# Patient Record
Sex: FEMALE | HISPANIC OR LATINO | Employment: UNEMPLOYED | URBAN - METROPOLITAN AREA
[De-identification: names, ages, dates, MRNs, and addresses within clinical notes are randomized per-mention and may not be internally consistent; named-entity substitution may affect disease eponyms.]

---

## 2019-06-19 ENCOUNTER — HOSPITAL ENCOUNTER (OUTPATIENT)
Dept: LAB | Facility: MEDICAL CENTER | Age: 49
End: 2019-06-19
Attending: NURSE PRACTITIONER

## 2019-06-19 LAB
ALBUMIN SERPL BCP-MCNC: 4.1 G/DL (ref 3.2–4.9)
ALBUMIN/GLOB SERPL: 1.1 G/DL
ALP SERPL-CCNC: 106 U/L (ref 30–99)
ALT SERPL-CCNC: 20 U/L (ref 2–50)
ANION GAP SERPL CALC-SCNC: 11 MMOL/L (ref 0–11.9)
AST SERPL-CCNC: 25 U/L (ref 12–45)
BILIRUB SERPL-MCNC: 0.6 MG/DL (ref 0.1–1.5)
BUN SERPL-MCNC: 15 MG/DL (ref 8–22)
CALCIUM SERPL-MCNC: 9.8 MG/DL (ref 8.5–10.5)
CHLORIDE SERPL-SCNC: 103 MMOL/L (ref 96–112)
CHOLEST SERPL-MCNC: 193 MG/DL (ref 100–199)
CO2 SERPL-SCNC: 23 MMOL/L (ref 20–33)
CREAT SERPL-MCNC: 0.63 MG/DL (ref 0.5–1.4)
FASTING STATUS PATIENT QL REPORTED: NORMAL
GLOBULIN SER CALC-MCNC: 3.8 G/DL (ref 1.9–3.5)
GLUCOSE SERPL-MCNC: 89 MG/DL (ref 65–99)
HDLC SERPL-MCNC: 46 MG/DL
LDLC SERPL CALC-MCNC: 109 MG/DL
POTASSIUM SERPL-SCNC: 4.2 MMOL/L (ref 3.6–5.5)
PROT SERPL-MCNC: 7.9 G/DL (ref 6–8.2)
SODIUM SERPL-SCNC: 137 MMOL/L (ref 135–145)
TRIGL SERPL-MCNC: 191 MG/DL (ref 0–149)

## 2019-06-19 PROCEDURE — 80061 LIPID PANEL: CPT

## 2019-06-19 PROCEDURE — 80053 COMPREHEN METABOLIC PANEL: CPT

## 2019-06-19 PROCEDURE — 36415 COLL VENOUS BLD VENIPUNCTURE: CPT

## 2019-06-19 PROCEDURE — 83036 HEMOGLOBIN GLYCOSYLATED A1C: CPT

## 2019-06-20 LAB
EST. AVERAGE GLUCOSE BLD GHB EST-MCNC: 148 MG/DL
HBA1C MFR BLD: 6.8 % (ref 0–5.6)

## 2019-11-27 ENCOUNTER — HOSPITAL ENCOUNTER (OUTPATIENT)
Dept: LAB | Facility: MEDICAL CENTER | Age: 49
End: 2019-11-27
Attending: NURSE PRACTITIONER

## 2019-11-27 LAB
ALBUMIN SERPL BCP-MCNC: 4.1 G/DL (ref 3.2–4.9)
ALBUMIN/GLOB SERPL: 1.2 G/DL
ALP SERPL-CCNC: 107 U/L (ref 30–99)
ALT SERPL-CCNC: 11 U/L (ref 2–50)
ANION GAP SERPL CALC-SCNC: 7 MMOL/L (ref 0–11.9)
AST SERPL-CCNC: 17 U/L (ref 12–45)
BASOPHILS # BLD AUTO: 0.7 % (ref 0–1.8)
BASOPHILS # BLD: 0.07 K/UL (ref 0–0.12)
BILIRUB SERPL-MCNC: 0.5 MG/DL (ref 0.1–1.5)
BUN SERPL-MCNC: 18 MG/DL (ref 8–22)
CALCIUM SERPL-MCNC: 8.9 MG/DL (ref 8.5–10.5)
CHLORIDE SERPL-SCNC: 104 MMOL/L (ref 96–112)
CHOLEST SERPL-MCNC: 151 MG/DL (ref 100–199)
CO2 SERPL-SCNC: 23 MMOL/L (ref 20–33)
CREAT SERPL-MCNC: 0.54 MG/DL (ref 0.5–1.4)
EOSINOPHIL # BLD AUTO: 0.13 K/UL (ref 0–0.51)
EOSINOPHIL NFR BLD: 1.4 % (ref 0–6.9)
ERYTHROCYTE [DISTWIDTH] IN BLOOD BY AUTOMATED COUNT: 47.1 FL (ref 35.9–50)
EST. AVERAGE GLUCOSE BLD GHB EST-MCNC: 126 MG/DL
GLOBULIN SER CALC-MCNC: 3.4 G/DL (ref 1.9–3.5)
GLUCOSE SERPL-MCNC: 105 MG/DL (ref 65–99)
HBA1C MFR BLD: 6 % (ref 0–5.6)
HCT VFR BLD AUTO: 41.7 % (ref 37–47)
HDLC SERPL-MCNC: 47 MG/DL
HGB BLD-MCNC: 12.9 G/DL (ref 12–16)
IMM GRANULOCYTES # BLD AUTO: 0.03 K/UL (ref 0–0.11)
IMM GRANULOCYTES NFR BLD AUTO: 0.3 % (ref 0–0.9)
LDLC SERPL CALC-MCNC: 77 MG/DL
LYMPHOCYTES # BLD AUTO: 3.43 K/UL (ref 1–4.8)
LYMPHOCYTES NFR BLD: 36.2 % (ref 22–41)
MCH RBC QN AUTO: 26.8 PG (ref 27–33)
MCHC RBC AUTO-ENTMCNC: 30.9 G/DL (ref 33.6–35)
MCV RBC AUTO: 86.7 FL (ref 81.4–97.8)
MONOCYTES # BLD AUTO: 0.51 K/UL (ref 0–0.85)
MONOCYTES NFR BLD AUTO: 5.4 % (ref 0–13.4)
NEUTROPHILS # BLD AUTO: 5.31 K/UL (ref 2–7.15)
NEUTROPHILS NFR BLD: 56 % (ref 44–72)
NRBC # BLD AUTO: 0 K/UL
NRBC BLD-RTO: 0 /100 WBC
PLATELET # BLD AUTO: 338 K/UL (ref 164–446)
PMV BLD AUTO: 10 FL (ref 9–12.9)
POTASSIUM SERPL-SCNC: 4 MMOL/L (ref 3.6–5.5)
PROT SERPL-MCNC: 7.5 G/DL (ref 6–8.2)
RBC # BLD AUTO: 4.81 M/UL (ref 4.2–5.4)
SODIUM SERPL-SCNC: 134 MMOL/L (ref 135–145)
TRIGL SERPL-MCNC: 135 MG/DL (ref 0–149)
TSH SERPL DL<=0.005 MIU/L-ACNC: 1.6 UIU/ML (ref 0.38–5.33)
WBC # BLD AUTO: 9.5 K/UL (ref 4.8–10.8)

## 2019-11-27 PROCEDURE — 80061 LIPID PANEL: CPT

## 2019-11-27 PROCEDURE — 85025 COMPLETE CBC W/AUTO DIFF WBC: CPT

## 2019-11-27 PROCEDURE — 83036 HEMOGLOBIN GLYCOSYLATED A1C: CPT

## 2019-11-27 PROCEDURE — 80053 COMPREHEN METABOLIC PANEL: CPT

## 2019-11-27 PROCEDURE — 84443 ASSAY THYROID STIM HORMONE: CPT

## 2019-11-27 PROCEDURE — 36415 COLL VENOUS BLD VENIPUNCTURE: CPT

## 2021-12-30 ENCOUNTER — HOSPITAL ENCOUNTER (INPATIENT)
Facility: MEDICAL CENTER | Age: 51
LOS: 5 days | DRG: 418 | End: 2022-01-05
Attending: STUDENT IN AN ORGANIZED HEALTH CARE EDUCATION/TRAINING PROGRAM | Admitting: INTERNAL MEDICINE
Payer: MEDICAID

## 2021-12-30 DIAGNOSIS — K80.50 CHOLEDOCHOLITHIASIS: Primary | ICD-10-CM

## 2021-12-30 DIAGNOSIS — K80.81 BILIARY CALCULUS OF OTHER SITE WITH OBSTRUCTION: ICD-10-CM

## 2021-12-30 DIAGNOSIS — I16.0 HYPERTENSIVE URGENCY: ICD-10-CM

## 2021-12-30 DIAGNOSIS — K80.01 CALCULUS OF GALLBLADDER WITH ACUTE CHOLECYSTITIS AND OBSTRUCTION: ICD-10-CM

## 2021-12-30 DIAGNOSIS — E11.9 TYPE 2 DIABETES MELLITUS WITHOUT COMPLICATION, WITHOUT LONG-TERM CURRENT USE OF INSULIN (HCC): ICD-10-CM

## 2021-12-30 LAB
BASOPHILS # BLD AUTO: 0.4 % (ref 0–1.8)
BASOPHILS # BLD: 0.05 K/UL (ref 0–0.12)
EOSINOPHIL # BLD AUTO: 0.11 K/UL (ref 0–0.51)
EOSINOPHIL NFR BLD: 0.8 % (ref 0–6.9)
ERYTHROCYTE [DISTWIDTH] IN BLOOD BY AUTOMATED COUNT: 48.2 FL (ref 35.9–50)
HCT VFR BLD AUTO: 42.7 % (ref 37–47)
HGB BLD-MCNC: 13.3 G/DL (ref 12–16)
IMM GRANULOCYTES # BLD AUTO: 0.07 K/UL (ref 0–0.11)
IMM GRANULOCYTES NFR BLD AUTO: 0.5 % (ref 0–0.9)
LYMPHOCYTES # BLD AUTO: 3.1 K/UL (ref 1–4.8)
LYMPHOCYTES NFR BLD: 23.3 % (ref 22–41)
MCH RBC QN AUTO: 26.1 PG (ref 27–33)
MCHC RBC AUTO-ENTMCNC: 31.1 G/DL (ref 33.6–35)
MCV RBC AUTO: 83.9 FL (ref 81.4–97.8)
MONOCYTES # BLD AUTO: 0.55 K/UL (ref 0–0.85)
MONOCYTES NFR BLD AUTO: 4.1 % (ref 0–13.4)
NEUTROPHILS # BLD AUTO: 9.45 K/UL (ref 2–7.15)
NEUTROPHILS NFR BLD: 70.9 % (ref 44–72)
NRBC # BLD AUTO: 0 K/UL
NRBC BLD-RTO: 0 /100 WBC
PLATELET # BLD AUTO: 347 K/UL (ref 164–446)
PMV BLD AUTO: 9.6 FL (ref 9–12.9)
RBC # BLD AUTO: 5.09 M/UL (ref 4.2–5.4)
WBC # BLD AUTO: 13.3 K/UL (ref 4.8–10.8)

## 2021-12-30 PROCEDURE — 93005 ELECTROCARDIOGRAM TRACING: CPT

## 2021-12-30 PROCEDURE — 85025 COMPLETE CBC W/AUTO DIFF WBC: CPT

## 2021-12-30 PROCEDURE — 99291 CRITICAL CARE FIRST HOUR: CPT

## 2021-12-30 PROCEDURE — 84703 CHORIONIC GONADOTROPIN ASSAY: CPT

## 2021-12-30 PROCEDURE — 83690 ASSAY OF LIPASE: CPT

## 2021-12-30 PROCEDURE — 83036 HEMOGLOBIN GLYCOSYLATED A1C: CPT

## 2021-12-30 PROCEDURE — 93005 ELECTROCARDIOGRAM TRACING: CPT | Performed by: STUDENT IN AN ORGANIZED HEALTH CARE EDUCATION/TRAINING PROGRAM

## 2021-12-30 PROCEDURE — 80053 COMPREHEN METABOLIC PANEL: CPT

## 2021-12-30 PROCEDURE — 36415 COLL VENOUS BLD VENIPUNCTURE: CPT

## 2021-12-30 NOTE — LETTER
[facility]  GEN SURGERY [facility] 4TH  [address] [place] [id number]  [phone]     January 3, 2022    Patient: Darling Islas   YOB: 1970   Date of Visit: 01/03/2022       To Whom It May Concern:    Darling Islas was hospitalized at [facility] from 12/31/21 to 1/3/22 and required her , Lorenzo Bajwa, to be present during this time for care giving.    Sincerely,     August Devi M.D.

## 2021-12-31 ENCOUNTER — APPOINTMENT (OUTPATIENT)
Dept: RADIOLOGY | Facility: MEDICAL CENTER | Age: 51
DRG: 418 | End: 2021-12-31
Attending: STUDENT IN AN ORGANIZED HEALTH CARE EDUCATION/TRAINING PROGRAM
Payer: MEDICAID

## 2021-12-31 ENCOUNTER — ANESTHESIA EVENT (OUTPATIENT)
Dept: SURGERY | Facility: MEDICAL CENTER | Age: 51
DRG: 418 | End: 2021-12-31
Payer: MEDICAID

## 2021-12-31 ENCOUNTER — ANESTHESIA (OUTPATIENT)
Dept: SURGERY | Facility: MEDICAL CENTER | Age: 51
DRG: 418 | End: 2021-12-31
Payer: MEDICAID

## 2021-12-31 ENCOUNTER — APPOINTMENT (OUTPATIENT)
Dept: RADIOLOGY | Facility: MEDICAL CENTER | Age: 51
DRG: 418 | End: 2021-12-31
Attending: INTERNAL MEDICINE
Payer: MEDICAID

## 2021-12-31 PROBLEM — D72.829 LEUKOCYTOSIS (LEUCOCYTOSIS): Status: ACTIVE | Noted: 2021-12-31

## 2021-12-31 PROBLEM — K80.00 CALCULUS OF GALLBLADDER WITH ACUTE CHOLECYSTITIS WITHOUT OBSTRUCTION: Status: ACTIVE | Noted: 2021-12-31

## 2021-12-31 PROBLEM — E66.813 CLASS 3 SEVERE OBESITY IN ADULT: Status: ACTIVE | Noted: 2021-12-31

## 2021-12-31 PROBLEM — E66.01 CLASS 3 SEVERE OBESITY IN ADULT (HCC): Status: ACTIVE | Noted: 2021-12-31

## 2021-12-31 PROBLEM — K80.50 CHOLEDOCHOLITHIASIS: Status: ACTIVE | Noted: 2021-12-31

## 2021-12-31 PROBLEM — R74.01 TRANSAMINITIS: Status: ACTIVE | Noted: 2021-12-31

## 2021-12-31 PROBLEM — R10.11 RIGHT UPPER QUADRANT ABDOMINAL PAIN: Status: ACTIVE | Noted: 2021-12-31

## 2021-12-31 PROBLEM — R73.03 BORDERLINE DIABETES MELLITUS: Status: ACTIVE | Noted: 2021-12-31

## 2021-12-31 LAB
ALBUMIN SERPL BCP-MCNC: 4.3 G/DL (ref 3.2–4.9)
ALBUMIN/GLOB SERPL: 1.2 G/DL
ALP SERPL-CCNC: 590 U/L (ref 30–99)
ALT SERPL-CCNC: 232 U/L (ref 2–50)
ANION GAP SERPL CALC-SCNC: 12 MMOL/L (ref 7–16)
APPEARANCE UR: CLEAR
APTT PPP: 24 SEC (ref 24.7–36)
AST SERPL-CCNC: 328 U/L (ref 12–45)
BACTERIA #/AREA URNS HPF: NEGATIVE /HPF
BILIRUB SERPL-MCNC: 1.4 MG/DL (ref 0.1–1.5)
BILIRUB UR QL STRIP.AUTO: NEGATIVE
BUN SERPL-MCNC: 14 MG/DL (ref 8–22)
CALCIUM SERPL-MCNC: 9.8 MG/DL (ref 8.5–10.5)
CHLORIDE SERPL-SCNC: 98 MMOL/L (ref 96–112)
CO2 SERPL-SCNC: 24 MMOL/L (ref 20–33)
COLOR UR: YELLOW
CREAT SERPL-MCNC: 0.49 MG/DL (ref 0.5–1.4)
EKG IMPRESSION: NORMAL
EPI CELLS #/AREA URNS HPF: NORMAL /HPF
EST. AVERAGE GLUCOSE BLD GHB EST-MCNC: 163 MG/DL
EXTERNAL QUALITY CONTROL: NORMAL
GLOBULIN SER CALC-MCNC: 3.7 G/DL (ref 1.9–3.5)
GLUCOSE SERPL-MCNC: 182 MG/DL (ref 65–99)
GLUCOSE UR STRIP.AUTO-MCNC: NEGATIVE MG/DL
HBA1C MFR BLD: 7.3 % (ref 4–5.6)
HCG SERPL QL: NEGATIVE
HYALINE CASTS #/AREA URNS LPF: NORMAL /LPF
INR PPP: 0.96 (ref 0.87–1.13)
KETONES UR STRIP.AUTO-MCNC: NEGATIVE MG/DL
LEUKOCYTE ESTERASE UR QL STRIP.AUTO: NEGATIVE
LIPASE SERPL-CCNC: 38 U/L (ref 11–82)
MICRO URNS: ABNORMAL
NITRITE UR QL STRIP.AUTO: NEGATIVE
PATHOLOGY CONSULT NOTE: NORMAL
PH UR STRIP.AUTO: 6 [PH] (ref 5–8)
POTASSIUM SERPL-SCNC: 3.6 MMOL/L (ref 3.6–5.5)
PROT SERPL-MCNC: 8 G/DL (ref 6–8.2)
PROT UR QL STRIP: NEGATIVE MG/DL
PROTHROMBIN TIME: 12.5 SEC (ref 12–14.6)
RBC # URNS HPF: NORMAL /HPF
RBC UR QL AUTO: ABNORMAL
SARS-COV+SARS-COV-2 AG RESP QL IA.RAPID: NEGATIVE
SODIUM SERPL-SCNC: 134 MMOL/L (ref 135–145)
SP GR UR STRIP.AUTO: 1.01
UROBILINOGEN UR STRIP.AUTO-MCNC: 0.2 MG/DL
WBC #/AREA URNS HPF: NORMAL /HPF

## 2021-12-31 PROCEDURE — 502571 HCHG PACK, LAP CHOLE: Performed by: SURGERY

## 2021-12-31 PROCEDURE — 96365 THER/PROPH/DIAG IV INF INIT: CPT

## 2021-12-31 PROCEDURE — 74181 MRI ABDOMEN W/O CONTRAST: CPT

## 2021-12-31 PROCEDURE — 99221 1ST HOSP IP/OBS SF/LOW 40: CPT | Mod: 57 | Performed by: SURGERY

## 2021-12-31 PROCEDURE — 160009 HCHG ANES TIME/MIN: Performed by: SURGERY

## 2021-12-31 PROCEDURE — 501583 HCHG TROCAR, THRD CAN&SEAL 5X100: Performed by: SURGERY

## 2021-12-31 PROCEDURE — 99233 SBSQ HOSP IP/OBS HIGH 50: CPT | Performed by: INTERNAL MEDICINE

## 2021-12-31 PROCEDURE — 700111 HCHG RX REV CODE 636 W/ 250 OVERRIDE (IP): Performed by: INTERNAL MEDICINE

## 2021-12-31 PROCEDURE — 501838 HCHG SUTURE GENERAL: Performed by: SURGERY

## 2021-12-31 PROCEDURE — 700101 HCHG RX REV CODE 250: Performed by: ANESTHESIOLOGY

## 2021-12-31 PROCEDURE — 501577 HCHG TROCAR, STEP 11MM: Performed by: SURGERY

## 2021-12-31 PROCEDURE — 700111 HCHG RX REV CODE 636 W/ 250 OVERRIDE (IP): Performed by: ANESTHESIOLOGY

## 2021-12-31 PROCEDURE — 700105 HCHG RX REV CODE 258: Performed by: STUDENT IN AN ORGANIZED HEALTH CARE EDUCATION/TRAINING PROGRAM

## 2021-12-31 PROCEDURE — 501570 HCHG TROCAR, SEPARATOR: Performed by: SURGERY

## 2021-12-31 PROCEDURE — 501568 HCHG TROCAR, BLUNTPORT 12MM: Performed by: SURGERY

## 2021-12-31 PROCEDURE — 700105 HCHG RX REV CODE 258: Performed by: INTERNAL MEDICINE

## 2021-12-31 PROCEDURE — 160035 HCHG PACU - 1ST 60 MINS PHASE I: Performed by: SURGERY

## 2021-12-31 PROCEDURE — 160036 HCHG PACU - EA ADDL 30 MINS PHASE I: Performed by: SURGERY

## 2021-12-31 PROCEDURE — 96375 TX/PRO/DX INJ NEW DRUG ADDON: CPT

## 2021-12-31 PROCEDURE — 36415 COLL VENOUS BLD VENIPUNCTURE: CPT

## 2021-12-31 PROCEDURE — 82962 GLUCOSE BLOOD TEST: CPT

## 2021-12-31 PROCEDURE — 700105 HCHG RX REV CODE 258: Performed by: ANESTHESIOLOGY

## 2021-12-31 PROCEDURE — 85730 THROMBOPLASTIN TIME PARTIAL: CPT

## 2021-12-31 PROCEDURE — 0FT44ZZ RESECTION OF GALLBLADDER, PERCUTANEOUS ENDOSCOPIC APPROACH: ICD-10-PCS | Performed by: SURGERY

## 2021-12-31 PROCEDURE — 81001 URINALYSIS AUTO W/SCOPE: CPT

## 2021-12-31 PROCEDURE — 700101 HCHG RX REV CODE 250: Performed by: SURGERY

## 2021-12-31 PROCEDURE — 501338 HCHG SHEARS, ENDO: Performed by: SURGERY

## 2021-12-31 PROCEDURE — 700102 HCHG RX REV CODE 250 W/ 637 OVERRIDE(OP): Performed by: ANESTHESIOLOGY

## 2021-12-31 PROCEDURE — 47562 LAPAROSCOPIC CHOLECYSTECTOMY: CPT | Performed by: SURGERY

## 2021-12-31 PROCEDURE — 87426 SARSCOV CORONAVIRUS AG IA: CPT | Performed by: SURGERY

## 2021-12-31 PROCEDURE — 85610 PROTHROMBIN TIME: CPT

## 2021-12-31 PROCEDURE — 160041 HCHG SURGERY MINUTES - EA ADDL 1 MIN LEVEL 4: Performed by: SURGERY

## 2021-12-31 PROCEDURE — 700102 HCHG RX REV CODE 250 W/ 637 OVERRIDE(OP): Performed by: HOSPITALIST

## 2021-12-31 PROCEDURE — 88304 TISSUE EXAM BY PATHOLOGIST: CPT

## 2021-12-31 PROCEDURE — 160002 HCHG RECOVERY MINUTES (STAT): Performed by: SURGERY

## 2021-12-31 PROCEDURE — 160029 HCHG SURGERY MINUTES - 1ST 30 MINS LEVEL 4: Performed by: SURGERY

## 2021-12-31 PROCEDURE — 770001 HCHG ROOM/CARE - MED/SURG/GYN PRIV*

## 2021-12-31 PROCEDURE — A9270 NON-COVERED ITEM OR SERVICE: HCPCS | Performed by: ANESTHESIOLOGY

## 2021-12-31 PROCEDURE — 700111 HCHG RX REV CODE 636 W/ 250 OVERRIDE (IP): Performed by: STUDENT IN AN ORGANIZED HEALTH CARE EDUCATION/TRAINING PROGRAM

## 2021-12-31 PROCEDURE — 76705 ECHO EXAM OF ABDOMEN: CPT

## 2021-12-31 PROCEDURE — 501399 HCHG SPECIMAN BAG, ENDO CATC: Performed by: SURGERY

## 2021-12-31 PROCEDURE — 160048 HCHG OR STATISTICAL LEVEL 1-5: Performed by: SURGERY

## 2021-12-31 RX ORDER — OXYCODONE HYDROCHLORIDE 5 MG/1
2.5 TABLET ORAL
Status: DISCONTINUED | OUTPATIENT
Start: 2021-12-31 | End: 2022-01-05 | Stop reason: HOSPADM

## 2021-12-31 RX ORDER — HYDRALAZINE HYDROCHLORIDE 20 MG/ML
5 INJECTION INTRAMUSCULAR; INTRAVENOUS
Status: DISCONTINUED | OUTPATIENT
Start: 2021-12-31 | End: 2021-12-31 | Stop reason: HOSPADM

## 2021-12-31 RX ORDER — SODIUM CHLORIDE, SODIUM LACTATE, POTASSIUM CHLORIDE, CALCIUM CHLORIDE 600; 310; 30; 20 MG/100ML; MG/100ML; MG/100ML; MG/100ML
INJECTION, SOLUTION INTRAVENOUS CONTINUOUS
Status: DISCONTINUED | OUTPATIENT
Start: 2021-12-31 | End: 2021-12-31 | Stop reason: HOSPADM

## 2021-12-31 RX ORDER — ONDANSETRON 2 MG/ML
4 INJECTION INTRAMUSCULAR; INTRAVENOUS
Status: COMPLETED | OUTPATIENT
Start: 2021-12-31 | End: 2021-12-31

## 2021-12-31 RX ORDER — ONDANSETRON 2 MG/ML
8 INJECTION INTRAMUSCULAR; INTRAVENOUS ONCE
Status: COMPLETED | OUTPATIENT
Start: 2021-12-31 | End: 2021-12-31

## 2021-12-31 RX ORDER — DEXTROSE MONOHYDRATE 25 G/50ML
50 INJECTION, SOLUTION INTRAVENOUS
Status: DISCONTINUED | OUTPATIENT
Start: 2021-12-31 | End: 2022-01-05 | Stop reason: HOSPADM

## 2021-12-31 RX ORDER — SODIUM CHLORIDE 9 MG/ML
INJECTION, SOLUTION INTRAVENOUS CONTINUOUS
Status: ACTIVE | OUTPATIENT
Start: 2021-12-31 | End: 2021-12-31

## 2021-12-31 RX ORDER — BISACODYL 10 MG
10 SUPPOSITORY, RECTAL RECTAL
Status: DISCONTINUED | OUTPATIENT
Start: 2021-12-31 | End: 2022-01-05 | Stop reason: HOSPADM

## 2021-12-31 RX ORDER — ONDANSETRON 2 MG/ML
INJECTION INTRAMUSCULAR; INTRAVENOUS PRN
Status: DISCONTINUED | OUTPATIENT
Start: 2021-12-31 | End: 2021-12-31 | Stop reason: SURG

## 2021-12-31 RX ORDER — PROCHLORPERAZINE EDISYLATE 5 MG/ML
5-10 INJECTION INTRAMUSCULAR; INTRAVENOUS EVERY 4 HOURS PRN
Status: DISCONTINUED | OUTPATIENT
Start: 2021-12-31 | End: 2022-01-05 | Stop reason: HOSPADM

## 2021-12-31 RX ORDER — PROMETHAZINE HYDROCHLORIDE 25 MG/1
12.5-25 SUPPOSITORY RECTAL EVERY 4 HOURS PRN
Status: DISCONTINUED | OUTPATIENT
Start: 2021-12-31 | End: 2022-01-05 | Stop reason: HOSPADM

## 2021-12-31 RX ORDER — DEXAMETHASONE SODIUM PHOSPHATE 4 MG/ML
INJECTION, SOLUTION INTRA-ARTICULAR; INTRALESIONAL; INTRAMUSCULAR; INTRAVENOUS; SOFT TISSUE PRN
Status: DISCONTINUED | OUTPATIENT
Start: 2021-12-31 | End: 2021-12-31 | Stop reason: SURG

## 2021-12-31 RX ORDER — AMOXICILLIN 250 MG
2 CAPSULE ORAL 2 TIMES DAILY
Status: DISCONTINUED | OUTPATIENT
Start: 2021-12-31 | End: 2022-01-05 | Stop reason: HOSPADM

## 2021-12-31 RX ORDER — HYDROMORPHONE HYDROCHLORIDE 1 MG/ML
0.4 INJECTION, SOLUTION INTRAMUSCULAR; INTRAVENOUS; SUBCUTANEOUS
Status: DISCONTINUED | OUTPATIENT
Start: 2021-12-31 | End: 2021-12-31 | Stop reason: HOSPADM

## 2021-12-31 RX ORDER — HEPARIN SODIUM 5000 [USP'U]/ML
5000 INJECTION, SOLUTION INTRAVENOUS; SUBCUTANEOUS EVERY 8 HOURS
Status: DISCONTINUED | OUTPATIENT
Start: 2021-12-31 | End: 2022-01-01

## 2021-12-31 RX ORDER — LABETALOL HYDROCHLORIDE 5 MG/ML
10 INJECTION, SOLUTION INTRAVENOUS EVERY 4 HOURS PRN
Status: DISCONTINUED | OUTPATIENT
Start: 2021-12-31 | End: 2022-01-04

## 2021-12-31 RX ORDER — OXYCODONE HCL 5 MG/5 ML
10 SOLUTION, ORAL ORAL
Status: COMPLETED | OUTPATIENT
Start: 2021-12-31 | End: 2021-12-31

## 2021-12-31 RX ORDER — BUPIVACAINE HYDROCHLORIDE AND EPINEPHRINE 5; 5 MG/ML; UG/ML
INJECTION, SOLUTION EPIDURAL; INTRACAUDAL; PERINEURAL
Status: DISCONTINUED | OUTPATIENT
Start: 2021-12-31 | End: 2021-12-31 | Stop reason: HOSPADM

## 2021-12-31 RX ORDER — SODIUM CHLORIDE 9 MG/ML
1000 INJECTION, SOLUTION INTRAVENOUS ONCE
Status: COMPLETED | OUTPATIENT
Start: 2021-12-31 | End: 2021-12-31

## 2021-12-31 RX ORDER — HYDROMORPHONE HYDROCHLORIDE 1 MG/ML
0.25 INJECTION, SOLUTION INTRAMUSCULAR; INTRAVENOUS; SUBCUTANEOUS
Status: DISCONTINUED | OUTPATIENT
Start: 2021-12-31 | End: 2022-01-05 | Stop reason: HOSPADM

## 2021-12-31 RX ORDER — ONDANSETRON 4 MG/1
4 TABLET, ORALLY DISINTEGRATING ORAL EVERY 4 HOURS PRN
Status: DISCONTINUED | OUTPATIENT
Start: 2021-12-31 | End: 2022-01-05 | Stop reason: HOSPADM

## 2021-12-31 RX ORDER — HYDROMORPHONE HYDROCHLORIDE 1 MG/ML
0.1 INJECTION, SOLUTION INTRAMUSCULAR; INTRAVENOUS; SUBCUTANEOUS
Status: DISCONTINUED | OUTPATIENT
Start: 2021-12-31 | End: 2021-12-31 | Stop reason: HOSPADM

## 2021-12-31 RX ORDER — LIDOCAINE HYDROCHLORIDE 20 MG/ML
INJECTION, SOLUTION EPIDURAL; INFILTRATION; INTRACAUDAL; PERINEURAL PRN
Status: DISCONTINUED | OUTPATIENT
Start: 2021-12-31 | End: 2021-12-31 | Stop reason: SURG

## 2021-12-31 RX ORDER — DIPHENHYDRAMINE HYDROCHLORIDE 50 MG/ML
6.25 INJECTION INTRAMUSCULAR; INTRAVENOUS
Status: DISCONTINUED | OUTPATIENT
Start: 2021-12-31 | End: 2021-12-31 | Stop reason: HOSPADM

## 2021-12-31 RX ORDER — ONDANSETRON 2 MG/ML
4 INJECTION INTRAMUSCULAR; INTRAVENOUS EVERY 4 HOURS PRN
Status: DISCONTINUED | OUTPATIENT
Start: 2021-12-31 | End: 2022-01-05 | Stop reason: HOSPADM

## 2021-12-31 RX ORDER — HYDROMORPHONE HYDROCHLORIDE 1 MG/ML
0.2 INJECTION, SOLUTION INTRAMUSCULAR; INTRAVENOUS; SUBCUTANEOUS
Status: DISCONTINUED | OUTPATIENT
Start: 2021-12-31 | End: 2021-12-31 | Stop reason: HOSPADM

## 2021-12-31 RX ORDER — MEPERIDINE HYDROCHLORIDE 25 MG/ML
12.5 INJECTION INTRAMUSCULAR; INTRAVENOUS; SUBCUTANEOUS
Status: DISCONTINUED | OUTPATIENT
Start: 2021-12-31 | End: 2021-12-31 | Stop reason: HOSPADM

## 2021-12-31 RX ORDER — OXYCODONE HCL 5 MG/5 ML
5 SOLUTION, ORAL ORAL
Status: COMPLETED | OUTPATIENT
Start: 2021-12-31 | End: 2021-12-31

## 2021-12-31 RX ORDER — SODIUM CHLORIDE, SODIUM LACTATE, POTASSIUM CHLORIDE, CALCIUM CHLORIDE 600; 310; 30; 20 MG/100ML; MG/100ML; MG/100ML; MG/100ML
INJECTION, SOLUTION INTRAVENOUS
Status: DISCONTINUED | OUTPATIENT
Start: 2021-12-31 | End: 2021-12-31 | Stop reason: SURG

## 2021-12-31 RX ORDER — HALOPERIDOL 5 MG/ML
1 INJECTION INTRAMUSCULAR
Status: DISCONTINUED | OUTPATIENT
Start: 2021-12-31 | End: 2021-12-31 | Stop reason: HOSPADM

## 2021-12-31 RX ORDER — PROMETHAZINE HYDROCHLORIDE 25 MG/1
12.5-25 TABLET ORAL EVERY 4 HOURS PRN
Status: DISCONTINUED | OUTPATIENT
Start: 2021-12-31 | End: 2022-01-05 | Stop reason: HOSPADM

## 2021-12-31 RX ORDER — ROCURONIUM BROMIDE 10 MG/ML
INJECTION, SOLUTION INTRAVENOUS PRN
Status: DISCONTINUED | OUTPATIENT
Start: 2021-12-31 | End: 2021-12-31 | Stop reason: SURG

## 2021-12-31 RX ORDER — POLYETHYLENE GLYCOL 3350 17 G/17G
1 POWDER, FOR SOLUTION ORAL
Status: DISCONTINUED | OUTPATIENT
Start: 2021-12-31 | End: 2022-01-05 | Stop reason: HOSPADM

## 2021-12-31 RX ORDER — MORPHINE SULFATE 4 MG/ML
4 INJECTION INTRAVENOUS ONCE
Status: COMPLETED | OUTPATIENT
Start: 2021-12-31 | End: 2021-12-31

## 2021-12-31 RX ORDER — OXYCODONE HYDROCHLORIDE 5 MG/1
5 TABLET ORAL
Status: DISCONTINUED | OUTPATIENT
Start: 2021-12-31 | End: 2022-01-05 | Stop reason: HOSPADM

## 2021-12-31 RX ADMIN — ONDANSETRON 4 MG: 2 INJECTION INTRAMUSCULAR; INTRAVENOUS at 13:40

## 2021-12-31 RX ADMIN — HALOPERIDOL LACTATE 1 MG: 5 INJECTION, SOLUTION INTRAMUSCULAR at 13:45

## 2021-12-31 RX ADMIN — FENTANYL CITRATE 100 MCG: 50 INJECTION, SOLUTION INTRAMUSCULAR; INTRAVENOUS at 11:49

## 2021-12-31 RX ADMIN — SODIUM CHLORIDE 1000 ML: 9 INJECTION, SOLUTION INTRAVENOUS at 01:07

## 2021-12-31 RX ADMIN — SODIUM CHLORIDE, POTASSIUM CHLORIDE, SODIUM LACTATE AND CALCIUM CHLORIDE: 600; 310; 30; 20 INJECTION, SOLUTION INTRAVENOUS at 11:46

## 2021-12-31 RX ADMIN — ROCURONIUM BROMIDE 20 MG: 10 INJECTION, SOLUTION INTRAVENOUS at 12:34

## 2021-12-31 RX ADMIN — SODIUM CHLORIDE, POTASSIUM CHLORIDE, SODIUM LACTATE AND CALCIUM CHLORIDE: 600; 310; 30; 20 INJECTION, SOLUTION INTRAVENOUS at 12:53

## 2021-12-31 RX ADMIN — PIPERACILLIN AND TAZOBACTAM 4.5 G: 4; .5 INJECTION, POWDER, LYOPHILIZED, FOR SOLUTION INTRAVENOUS; PARENTERAL at 07:59

## 2021-12-31 RX ADMIN — ONDANSETRON 8 MG: 2 INJECTION INTRAMUSCULAR; INTRAVENOUS at 01:01

## 2021-12-31 RX ADMIN — EPHEDRINE SULFATE 10 MG: 50 INJECTION, SOLUTION INTRAVENOUS at 12:10

## 2021-12-31 RX ADMIN — PROPOFOL 200 MG: 10 INJECTION, EMULSION INTRAVENOUS at 11:49

## 2021-12-31 RX ADMIN — PIPERACILLIN AND TAZOBACTAM 4.5 G: 4; .5 INJECTION, POWDER, LYOPHILIZED, FOR SOLUTION INTRAVENOUS; PARENTERAL at 21:49

## 2021-12-31 RX ADMIN — LIDOCAINE HYDROCHLORIDE 60 MG: 20 INJECTION, SOLUTION EPIDURAL; INFILTRATION; INTRACAUDAL at 11:49

## 2021-12-31 RX ADMIN — INSULIN HUMAN 2 UNITS: 100 INJECTION, SOLUTION PARENTERAL at 23:36

## 2021-12-31 RX ADMIN — ROCURONIUM BROMIDE 60 MG: 10 INJECTION, SOLUTION INTRAVENOUS at 11:49

## 2021-12-31 RX ADMIN — MORPHINE SULFATE 4 MG: 4 INJECTION INTRAVENOUS at 01:00

## 2021-12-31 RX ADMIN — SODIUM CHLORIDE: 9 INJECTION, SOLUTION INTRAVENOUS at 06:39

## 2021-12-31 RX ADMIN — FENTANYL CITRATE 50 MCG: 50 INJECTION, SOLUTION INTRAMUSCULAR; INTRAVENOUS at 12:48

## 2021-12-31 RX ADMIN — FENTANYL CITRATE 50 MCG: 50 INJECTION, SOLUTION INTRAMUSCULAR; INTRAVENOUS at 12:18

## 2021-12-31 RX ADMIN — DEXAMETHASONE SODIUM PHOSPHATE 8 MG: 4 INJECTION, SOLUTION INTRA-ARTICULAR; INTRALESIONAL; INTRAMUSCULAR; INTRAVENOUS; SOFT TISSUE at 11:54

## 2021-12-31 RX ADMIN — ONDANSETRON 4 MG: 2 INJECTION INTRAMUSCULAR; INTRAVENOUS at 12:42

## 2021-12-31 RX ADMIN — OXYCODONE HYDROCHLORIDE 10 MG: 5 SOLUTION ORAL at 13:40

## 2021-12-31 RX ADMIN — PIPERACILLIN AND TAZOBACTAM 4.5 G: 4; .5 INJECTION, POWDER, LYOPHILIZED, FOR SOLUTION INTRAVENOUS; PARENTERAL at 03:18

## 2021-12-31 RX ADMIN — EPHEDRINE SULFATE 10 MG: 50 INJECTION, SOLUTION INTRAVENOUS at 12:04

## 2021-12-31 ASSESSMENT — ENCOUNTER SYMPTOMS
CONSTIPATION: 0
COUGH: 0
SPUTUM PRODUCTION: 0
DEPRESSION: 0
LOSS OF CONSCIOUSNESS: 0
HEMOPTYSIS: 0
CHILLS: 1
WEIGHT LOSS: 0
SORE THROAT: 0
WEAKNESS: 0
SHORTNESS OF BREATH: 0
SENSORY CHANGE: 0
NECK PAIN: 0
VOMITING: 0
FALLS: 0
WHEEZING: 0
ABDOMINAL PAIN: 1
VOMITING: 1
HEADACHES: 0
EYE DISCHARGE: 0
EYE PAIN: 0
MYALGIAS: 0
DIAPHORESIS: 0
STRIDOR: 0
FEVER: 0
CHILLS: 0
DIARRHEA: 0
BACK PAIN: 0
DOUBLE VISION: 0
DIZZINESS: 0
NAUSEA: 1
BRUISES/BLEEDS EASILY: 0
BLURRED VISION: 0
CLAUDICATION: 0
INSOMNIA: 0
PALPITATIONS: 0
FOCAL WEAKNESS: 0
SPEECH CHANGE: 0

## 2021-12-31 ASSESSMENT — PATIENT HEALTH QUESTIONNAIRE - PHQ9
1. LITTLE INTEREST OR PLEASURE IN DOING THINGS: NOT AT ALL
2. FEELING DOWN, DEPRESSED, IRRITABLE, OR HOPELESS: NOT AT ALL
SUM OF ALL RESPONSES TO PHQ9 QUESTIONS 1 AND 2: 0

## 2021-12-31 ASSESSMENT — LIFESTYLE VARIABLES
ON A TYPICAL DAY WHEN YOU DRINK ALCOHOL HOW MANY DRINKS DO YOU HAVE: 0
HAVE PEOPLE ANNOYED YOU BY CRITICIZING YOUR DRINKING: NO
AVERAGE NUMBER OF DAYS PER WEEK YOU HAVE A DRINK CONTAINING ALCOHOL: 0
ALCOHOL_USE: NO
EVER FELT BAD OR GUILTY ABOUT YOUR DRINKING: NO
TOTAL SCORE: 0
TOTAL SCORE: 0
HOW MANY TIMES IN THE PAST YEAR HAVE YOU HAD 5 OR MORE DRINKS IN A DAY: 0
TOTAL SCORE: 0
SUBSTANCE_ABUSE: 0
EVER HAD A DRINK FIRST THING IN THE MORNING TO STEADY YOUR NERVES TO GET RID OF A HANGOVER: NO
CONSUMPTION TOTAL: NEGATIVE
HAVE YOU EVER FELT YOU SHOULD CUT DOWN ON YOUR DRINKING: NO

## 2021-12-31 ASSESSMENT — PAIN DESCRIPTION - PAIN TYPE
TYPE: SURGICAL PAIN
TYPE: ACUTE PAIN
TYPE: SURGICAL PAIN

## 2021-12-31 NOTE — ED PROVIDER NOTES
ED Provider Note    Chief Complaint:   Abdominal pain    HPI:  Darling Islas is a very pleasant 51-year-old female with past medical history of hypertension who presents with 1 week of intermittent epigastric and right upper quadrant pain that radiates to the back, pressure-like, worsening, severe at this time.  Patient also endorses nausea without emesis.  Patient denies black or bloody stools, diarrhea or urinary symptoms.  Patient reports that pain is worse with food.  Patient reports she still has her gallbladder.    Review of Systems:  Review of Systems   Constitutional: Negative for chills and fever.   HENT: Negative for congestion and sore throat.    Eyes: Negative for blurred vision and double vision.   Respiratory: Negative for cough and shortness of breath.    Cardiovascular: Negative for chest pain and leg swelling.   Gastrointestinal: Positive for abdominal pain and nausea. Negative for vomiting.   Genitourinary: Negative for dysuria and hematuria.   Musculoskeletal: Negative for falls and neck pain.   Skin: Negative for itching and rash.   Neurological: Negative for loss of consciousness and headaches.       Past Medical History:   has a past medical history of Advanced maternal age in pregnancy (2013), History of  (2013), Hypertension, Kidney disease, Kidney stones, Migraine, TOXOPLASMOSIS, Toxoplasmosis (acquired) (10 years ago in [place]), and Vertigo.    Social History:  Social History     Tobacco Use   • Smoking status: Never Smoker   • Smokeless tobacco: Never Used   Vaping Use   • Vaping Use: Never used   Substance and Sexual Activity   • Alcohol use: No   • Drug use: No   • Sexual activity: Yes     Partners: Male     Comment: None       Surgical History:   has a past surgical history that includes primary c section () and repeat c section w tubal ligation (2013).    Current Medications:  Home Medications     Reviewed by Jorge L Sanford (Pharmacy Tech) on  12/31/21 at 0301  Med List Status: Complete   Medication Last Dose Status   metFORMIN (GLUCOPHAGE) 500 MG Tab ~1 WEEK Active                Allergies:  Allergies   Allergen Reactions   • Nkda [No Known Drug Allergy]        Physical Exam:  Vital Signs: /75   Pulse 74   Temp 37.1 °C (98.8 °F) (Temporal)   Resp 19   Ht 1.524 m (5')   Wt 96.1 kg (211 lb 13.8 oz)   SpO2 97%   BMI 41.38 kg/m²   Physical Exam  Vitals and nursing note reviewed.   Constitutional:       Comments: Patient is lying in bed supine, pleasant, conversant, speaking in complete sentences   HENT:      Head: Normocephalic and atraumatic.   Eyes:      Extraocular Movements: Extraocular movements intact.      Conjunctiva/sclera: Conjunctivae normal.      Pupils: Pupils are equal, round, and reactive to light.   Cardiovascular:      Pulses: Normal pulses.      Comments: HR 87  Pulmonary:      Effort: Pulmonary effort is normal. No respiratory distress.   Abdominal:      Comments: Right upper quadrant tenderness to palpation, no rebound, guarding, masses, no rigidity   Musculoskeletal:         General: No swelling. Normal range of motion.      Cervical back: Normal range of motion. No rigidity.   Skin:     General: Skin is warm and dry.      Capillary Refill: Capillary refill takes less than 2 seconds.   Neurological:      Mental Status: She is alert.         Medical records reviewed for continuity of care.     Results for orders placed or performed during the hospital encounter of 12/30/21   CBC WITH DIFFERENTIAL   Result Value Ref Range    WBC 13.3 (H) 4.8 - 10.8 K/uL    RBC 5.09 4.20 - 5.40 M/uL    Hemoglobin 13.3 12.0 - 16.0 g/dL    Hematocrit 42.7 37.0 - 47.0 %    MCV 83.9 81.4 - 97.8 fL    MCH 26.1 (L) 27.0 - 33.0 pg    MCHC 31.1 (L) 33.6 - 35.0 g/dL    RDW 48.2 35.9 - 50.0 fL    Platelet Count 347 164 - 446 K/uL    MPV 9.6 9.0 - 12.9 fL    Neutrophils-Polys 70.90 44.00 - 72.00 %    Lymphocytes 23.30 22.00 - 41.00 %    Monocytes 4.10  0.00 - 13.40 %    Eosinophils 0.80 0.00 - 6.90 %    Basophils 0.40 0.00 - 1.80 %    Immature Granulocytes 0.50 0.00 - 0.90 %    Nucleated RBC 0.00 /100 WBC    Neutrophils (Absolute) 9.45 (H) 2.00 - 7.15 K/uL    Lymphs (Absolute) 3.10 1.00 - 4.80 K/uL    Monos (Absolute) 0.55 0.00 - 0.85 K/uL    Eos (Absolute) 0.11 0.00 - 0.51 K/uL    Baso (Absolute) 0.05 0.00 - 0.12 K/uL    Immature Granulocytes (abs) 0.07 0.00 - 0.11 K/uL    NRBC (Absolute) 0.00 K/uL   COMP METABOLIC PANEL   Result Value Ref Range    Sodium 134 (L) 135 - 145 mmol/L    Potassium 3.6 3.6 - 5.5 mmol/L    Chloride 98 96 - 112 mmol/L    Co2 24 20 - 33 mmol/L    Anion Gap 12.0 7.0 - 16.0    Glucose 182 (H) 65 - 99 mg/dL    Bun 14 8 - 22 mg/dL    Creatinine 0.49 (L) 0.50 - 1.40 mg/dL    Calcium 9.8 8.5 - 10.5 mg/dL    AST(SGOT) 328 (H) 12 - 45 U/L    ALT(SGPT) 232 (H) 2 - 50 U/L    Alkaline Phosphatase 590 (H) 30 - 99 U/L    Total Bilirubin 1.4 0.1 - 1.5 mg/dL    Albumin 4.3 3.2 - 4.9 g/dL    Total Protein 8.0 6.0 - 8.2 g/dL    Globulin 3.7 (H) 1.9 - 3.5 g/dL    A-G Ratio 1.2 g/dL   LIPASE   Result Value Ref Range    Lipase 38 11 - 82 U/L   HCG QUAL SERUM   Result Value Ref Range    Beta-Hcg Qualitative Serum Negative Negative   URINALYSIS,CULTURE IF INDICATED    Specimen: Urine   Result Value Ref Range    Color Yellow     Character Clear     Specific Gravity 1.010 <1.035    Ph 6.0 5.0 - 8.0    Glucose Negative Negative mg/dL    Ketones Negative Negative mg/dL    Protein Negative Negative mg/dL    Bilirubin Negative Negative    Urobilinogen, Urine 0.2 Negative    Nitrite Negative Negative    Leukocyte Esterase Negative Negative    Occult Blood Trace (A) Negative    Micro Urine Req Microscopic    ESTIMATED GFR   Result Value Ref Range    GFR If African American >60 >60 mL/min/1.73 m 2    GFR If Non African American >60 >60 mL/min/1.73 m 2   URINE MICROSCOPIC (W/UA)   Result Value Ref Range    WBC 0-2 /hpf    RBC 0-2 /hpf    Bacteria Negative None /hpf     Epithelial Cells Few /hpf    Hyaline Cast 0-2 /lpf   EKG   Result Value Ref Range    Report       [facility] Emergency Dept.    Test Date:  2021  Pt Name:    KAYCEE LUA             Department: ER  MRN:        [id number]                      Room:       [facility]  Gender:     Female                       Technician: [id number]  :        1970                   Requested By:ER TRIAGE PROTOCOL  Order #:    [id number]                    Reading MD: Davis Fields MD    Measurements  Intervals                                Axis  Rate:       80                           P:          31  WI:         168                          QRS:        -2  QRSD:       80                           T:          40  QT:         416  QTc:        480    Interpretive Statements  EKG demonstrates normal sinus rhythm with a ventricular rate of 80, left axis  deviation, no conduction abnormalities, , no ST elevations or ST  depressions, no T wave inversions  Electronically Signed On 2021 3:26:56 PST by Davis Fields MD         Radiology:  US-RUQ   Final Result      1.  There is a wall echo shadow sign of the gallbladder which may be due to a gallbladder filled with stones or porcelain gallbladder.   2.  Common bile duct is dilated measuring up to 1.2 cm.   3.  Hepatic steatosis.      [id number]-W/O    (Results Pending)   BT-VBOITYT-T/O    (Results Pending)        ED Medications Administered:  Medications   piperacillin-tazobactam (ZOSYN) 4.5 g in  mL IVPB (4.5 g Intravenous New Bag 21 0318)     And   piperacillin-tazobactam (ZOSYN) 4.5 g in  mL IVPB (has no administration in time range)   senna-docusate (PERICOLACE or SENOKOT S) 8.6-50 MG per tablet 2 Tablet (has no administration in time range)     And   polyethylene glycol/lytes (MIRALAX) PACKET 1 Packet (has no administration in time range)     And   magnesium hydroxide (MILK OF MAGNESIA) suspension 30 mL (has no  administration in time range)     And   bisacodyl (DULCOLAX) suppository 10 mg (has no administration in time range)   NS infusion (has no administration in time range)   heparin injection 5,000 Units (has no administration in time range)   Pharmacy Consult Request ...Pain Management Review 1 Each (has no administration in time range)   oxyCODONE immediate-release (ROXICODONE) tablet 2.5 mg (has no administration in time range)     Or   oxyCODONE immediate-release (ROXICODONE) tablet 5 mg (has no administration in time range)     Or   HYDROmorphone (Dilaudid) injection 0.25 mg (has no administration in time range)   labetalol (NORMODYNE/TRANDATE) injection 10 mg (has no administration in time range)   ondansetron (ZOFRAN) syringe/vial injection 4 mg (has no administration in time range)   ondansetron (ZOFRAN ODT) dispertab 4 mg (has no administration in time range)   promethazine (PHENERGAN) tablet 12.5-25 mg (has no administration in time range)   promethazine (PHENERGAN) suppository 12.5-25 mg (has no administration in time range)   prochlorperazine (COMPAZINE) injection 5-10 mg (has no administration in time range)   NS infusion 1,000 mL (0 mL Intravenous Stopped 12/31/21 0159)   morphine 4 MG/ML injection 4 mg (4 mg Intravenous Given 12/31/21 0100)   ondansetron (ZOFRAN) syringe/vial injection 8 mg (8 mg Intravenous Given 12/31/21 0101)       MDM:  Right upper quadrant ultrasound to evaluate for cholecystitis.  Patient has significant elevation in LFTs, alk phos, AST, ALT.  Bilirubin also elevated compared to baseline but not grossly abnormal.  CBC demonstrates leukocytosis of 13.3.  Gastritis, GERD, dyspepsia also in the differential.  If ultrasound is unremarkable we will proceed with CT imaging to evaluate for other acute intra-abdominal process such as appendicitis, hernia, small bowel obstruction.  IV morphine, fluids, Zofran for symptom control.    Electronically signed by: Davis Fields M.D.,  12/31/2021, 12:54 AM    Dr Banuelos of gastroenterology has been consulted for concern for choledocholithiasis.  Patient was found to have an echogenic gallbladder concerning for porcelain gallbladder versus significant gallbladder stone burden.  Also patient has a dilated CBD.  Gastroenterology recommended n.p.o. status and MRCP.  Dr. Zafar of [facility] medicine has been gracious enough to accept the patient to his service.    This dictation has been created using voice recognition software. I am continuously working with the software to minimize the number of voice recognition errors and I have made every attempt to manually correct the errors within my dictation. However errors  related to this voice recognition software may still exist and should be interpreted within the appropriate context.     Electronically signed by: Davis Fields M.D., 12/31/2021 3:25 AM      Disposition:  Hospital medicine service    Final Impression:  1. Choledocholithiasis    2. Biliary calculus of other site with obstruction

## 2021-12-31 NOTE — PROGRESS NOTES
Surgery    Acute cholecystitis with elevated transaminases.    Candidate for urgent cholecystectomy: will add to ACS sched this morning.    Full consult to follow

## 2021-12-31 NOTE — H&P
"Hospital Medicine History & Physical Note    Date of Service  12/31/2021    Primary Care Physician  Pcp Pt States None    Consultants  GI    Specialist Names: Dr Banuelos of gastroenterology     Code Status  Full Code    Chief Complaint  Chief Complaint   Patient presents with   • Epigastric Pain     x 5 days, patient reports \"a really strong pain.\" Patient states the pain started as intermittent but now is not going away. Pain radiates to the right side of the back. Patient states the pain is worse with eating and drinking.        History of Presenting Illness  Darling Islas is a 51 y.o. [place]ish-speaking female who presented 12/30/2021 with past medical history of obesity and hypertension who presents with 1 week of intermittent epigastric and right upper quadrant pain that radiates to the back, pressure-like, worsening, severe at this time.  Patient also endorses nausea without emesis.  Patient denies black or bloody stools, diarrhea or urinary symptoms.  Patient reports that pain is worse with food.  Patient reports she still has her gallbladder.  Her work-up reveals LFT elevation, common bile duct dilatation and a CT concerning for porcelain gallbladder versus cholelithiasis.  Gastroenterologist was consulted recommending MRCP, she receives Zosyn and referred to the hospitalist for admission.  At bedside she is awake and alert oriented denying previous episode admitting 4 hours of fever and chills.    I discussed the plan of care with patient and family.    Review of Systems  Review of Systems   Constitutional: Positive for chills. Negative for fever, malaise/fatigue and weight loss.   HENT: Negative for sore throat and tinnitus.    Eyes: Negative for blurred vision and double vision.   Respiratory: Negative for cough, hemoptysis and stridor.    Cardiovascular: Negative for chest pain and palpitations.   Gastrointestinal: Positive for abdominal pain, nausea and vomiting.   Genitourinary: Negative for dysuria and " urgency.   Musculoskeletal: Negative for myalgias and neck pain.   Skin: Negative for itching and rash.   Neurological: Negative for dizziness and headaches.   Endo/Heme/Allergies: Does not bruise/bleed easily.   Psychiatric/Behavioral: Negative for depression. The patient does not have insomnia.        Past Medical History   has a past medical history of Advanced maternal age in pregnancy (2013), History of  (2013), Hypertension, Kidney disease, Kidney stones, Migraine, TOXOPLASMOSIS, Toxoplasmosis (acquired) (10 years ago in [place]), and Vertigo.    Surgical History   has a past surgical history that includes primary c section () and repeat c section w tubal ligation (2013).     Family History  family history includes Alcohol/Drug in her father; Diabetes in her father, paternal grandfather, and paternal grandmother; Hypertension in her mother.   Family history reviewed with patient. There is family history that is pertinent to the chief complaint.     Social History   reports that she has never smoked. She has never used smokeless tobacco. She reports that she does not drink alcohol and does not use drugs.    Allergies  Allergies   Allergen Reactions   • Nkda [No Known Drug Allergy]        Medications  Prior to Admission Medications   Prescriptions Last Dose Informant Patient Reported? Taking?   metFORMIN (GLUCOPHAGE) 500 MG Tab ~1 WEEK at unk  Yes Yes   Sig: Take 500 mg by mouth 2 times a day with meals.      Facility-Administered Medications: None       Physical Exam  Temp:  [37.1 °C (98.8 °F)] 37.1 °C (98.8 °F)  Pulse:  [70-87] 74  Resp:  [19] 19  BP: (135-175)/(68-83) 139/75  SpO2:  [96 %-98 %] 97 %  Blood Pressure: 139/75   Temperature: 37.1 °C (98.8 °F)   Pulse: 74   Respiration: 19   Pulse Oximetry: 97 %       Physical Exam    Laboratory:  Recent Labs     21  2321   WBC 13.3*   RBC 5.09   HEMOGLOBIN 13.3   HEMATOCRIT 42.7   MCV 83.9   MCH 26.1*   MCHC 31.1*   RDW 48.2    PLATELETCT 347   MPV 9.6     Recent Labs     12/30/21  2321   SODIUM 134*   POTASSIUM 3.6   CHLORIDE 98   CO2 24   GLUCOSE 182*   BUN 14   CREATININE 0.49*   CALCIUM 9.8     Recent Labs     12/30/21  2321   ALTSGPT 232*   ASTSGOT 328*   ALKPHOSPHAT 590*   TBILIRUBIN 1.4   LIPASE 38   GLUCOSE 182*         No results for input(s): NTPROBNP in the last 72 hours.      No results for input(s): TROPONINT in the last 72 hours.    Imaging:  US-RUQ   Final Result      1.  There is a wall echo shadow sign of the gallbladder which may be due to a gallbladder filled with stones or porcelain gallbladder.   2.  Common bile duct is dilated measuring up to 1.2 cm.   3.  Hepatic steatosis.      DK-[id number]-X/O    (Results Pending)   [id number]-Z/O    (Results Pending)       X-Ray:  I have personally reviewed the images and compared with prior images.    Assessment/Plan:  I anticipate this patient will require at least two midnights for appropriate medical management, necessitating inpatient admission.    * Choledocholithiasis- (present on admission)  Assessment & Plan  Zosyn, IV fluid, symptomatic management, follow-up MRCP and GI consult    Right upper quadrant abdominal pain  Assessment & Plan  Secondary to #1, symptomatic management, follow-up MRCP and GI consult      VTE prophylaxis: SCDs/TEDs

## 2021-12-31 NOTE — ED TRIAGE NOTES
"Darling Islas  51 y.o. F  Chief Complaint   Patient presents with   • Epigastric Pain     x 5 days, patient reports \"a really strong pain.\" Patient states the pain started as intermittent but now is not going away. Pain radiates to the right side of the back. Patient states the pain is worse with eating and drinking.      Patient reporting 10/10 pain. Difference in SBP from left to right <20.     Blood Pressure 152/80   Pulse 70   Temperature 37.1 °C (98.8 °F) (Temporal)   Respiration 19   Height 1.524 m (5')   Weight 96.1 kg (211 lb 13.8 oz)   Oxygen Saturation 98%   Body Mass Index 41.38 kg/m²     Triage process explained to patient, apologized for wait time, and returned to lobby.  Pt informed to notify staff of any change in condition.     "

## 2021-12-31 NOTE — OP REPORT
Operative report    PreOp Diagnosis: Acute cholecystitis      PostOp Diagnosis: Same      Procedure(s):  CHOLECYSTECTOMY, LAPAROSCOPIC - Wound Class: Clean Contaminated    Surgeon(s):  Shahid Cunha D.O.    Anesthesiologist/Type of Anesthesia:  Anesthesiologist: Jaspal May M.D./General    Surgical Staff:  Circulator: Laurie Matamoros R.N.  Scrub Person: Michelle Ballesteros    Specimens removed if any:  ID Type Source Tests Collected by Time Destination   A : gallbladder Tissue Gallbladder PATHOLOGY SPECIMEN Shahid Cunha D.O. 12/31/2021 11:08 AM        Estimated Blood Loss: Minimal      Findings: Small retracted gallbladder with wall thickening, pericholecystic edema and one large stone filling the entire lumen.    Complications: None noted    Indications: 51-year-old female with right upper quadrant pain, leukocytosis, upper GI symptoms and findings consistent with acute cholecystitis and possible common duct obstruction on initial work-up.  Subsequent MRCP showed no sign of common duct obstruction but confirmed acute cholecystitis.    OPERATIVE REPORT: The patient was brought to the operating room and placed in  supine position on the operating table. After adequate general anesthesia,   the abdomen was prepped and draped in standard fashion. An area inferior to the umbilucus was   infiltrated with 0.25% bupivacaine. An incision was made through the skin and  subcutaneous tissues. We then bluntly dissected down to the anterior fascia,  which was elevated into the wound using a Kocher clamp. A stay suture of 0   Vicryl was then placed. The fascia was then incised and we dissected through   the abdominal wall in layers until the peritoneum was entered. We then placed  the Sacha port and insufflated the abdomen. The area in the epigastrium was  chosen for a 5 mm port. The skin and subcutaneous tissue were infiltrated   with 0.25% bupivacaine. A small incision was made and a 5 mm port was   inserted under direct camera  observation. Two additional 5 mm ports were   placed in the right lateral abdominal wall using identical technique. I then   grasped the fundus of the gallbladder and retracted it anteriorly and   superiorly. The infundibulum was retracted anteriorly and laterally. We then  skeletonized the cystic duct, doubly clip distally and singly clipped   proximally and then divided with the endoscopic shear. The cystic artery was   then skeletonized and doubly clipped proximally and singly clipped distally   prior to dividing with the endoscopic shear. The gallbladder was then   dissected free from its fossa. When this was completed, we placed in an EndoCatch bag and   retrieved it from the umbilical port site. We then irrigated the gallbladder   fossa in the right upper quadrant until the effluent was clear. There was no   sign of bleeding or bile leakage. The ports were then removed. The fascia at  the umbilical port site was closed using the stay suture placed at the   beginning of the case. The wounds were then irrigated and the skin was closed  using a 4-0 Monocryl stitch in a subcuticular fashion. The area was then   cleaned and dried and Dermabond was  applied. The patient was then awakened from anesthesia and taken to   post-anesthesia care unit in stable condition. The sponge, needle, and   instrument count was correct at the end of the case and I was present for the   entirety of the case.      12/31/2021 1:18 PM Shahid Cunha D.O.

## 2021-12-31 NOTE — ANESTHESIA TIME REPORT
Anesthesia Start and Stop Event Times     Date Time Event    12/31/2021 1131 Ready for Procedure     1146 Anesthesia Start     1316 Anesthesia Stop        Responsible Staff  12/31/21    Name Role Begin End    Jaspal May M.D. Anesth 1146 1316        Preop Diagnosis (Free Text):  Pre-op Diagnosis     acute cholecystitis        Preop Diagnosis (Codes):    Premium Reason  F. Holiday    Comments:

## 2021-12-31 NOTE — ANESTHESIA PREPROCEDURE EVALUATION
Case: [id number] Date/Time: 12/31/21 1115    Procedure: CHOLECYSTECTOMY, LAPAROSCOPIC (N/A Abdomen)    Anesthesia type: General    Pre-op diagnosis: acute cholecystitis    Location: [facility] OR  / SURGERY [facility]    Surgeons: Shahid Cunha D.O.      htn  Dm  obesity    Relevant Problems   Other   (positive) Choledocholithiasis   (positive) Right upper quadrant abdominal pain       Physical Exam    Airway   Mallampati: II  TM distance: >3 FB  Neck ROM: full       Cardiovascular - normal exam  Rhythm: regular  Rate: normal  (-) murmur     Dental - normal exam           Pulmonary - normal exam  Breath sounds clear to auscultation     Abdominal   (+) obese     Neurological - normal exam                 Anesthesia Plan    ASA 3- EMERGENT   ASA physical status 3 criteria: morbid obesity - BMI greater than or equal to 40ASA physical status emergent criteria: acutely contaminated wound or identified infection source    Plan - general       Airway plan will be ETT          Induction: intravenous    Postoperative Plan: Postoperative administration of opioids is intended.    Pertinent diagnostic labs and testing reviewed    Informed Consent:    Anesthetic plan and risks discussed with patient (thru ).

## 2021-12-31 NOTE — ANESTHESIA POSTPROCEDURE EVALUATION
Patient: Darling Islas    Procedure Summary     Date: 12/31/21 Room / Location: [facility] 09 / SURGERY [facility]    Anesthesia Start: 1146 Anesthesia Stop: 1316    Procedure: CHOLECYSTECTOMY, LAPAROSCOPIC (N/A Abdomen) Diagnosis: (acute cholecystitis)    Surgeons: Shahid Cunha D.O. Responsible Provider: Jaspal May M.D.    Anesthesia Type: general ASA Status: 3 - Emergent          Final Anesthesia Type: general  Last vitals  BP   Blood Pressure: (!) 162/67    Temp   36.8 °C (98.3 °F)    Pulse   (!) 46   Resp   15    SpO2   98 %      Anesthesia Post Evaluation    Patient location during evaluation: PACU  Patient participation: complete - patient participated  Level of consciousness: awake and alert    Airway patency: patent  Anesthetic complications: no  Cardiovascular status: hemodynamically stable  Respiratory status: acceptable  Hydration status: euvolemic    PONV: none          No complications documented.     Nurse Pain Score: 0 (NPRS)

## 2021-12-31 NOTE — ED NOTES
Dry COVID swab obtained by RN, sent to Pre-op to run.      Surgical Resident at bedside with  for surgery instructions an consent.

## 2021-12-31 NOTE — ANESTHESIA PROCEDURE NOTES
Airway    Date/Time: 12/31/2021 11:49 AM  Performed by: Jaspal May M.D.  Authorized by: Jaspal May M.D.     Location:  OR  Urgency:  Elective  Difficult Airway: No    Indications for Airway Management:  Anesthesia      Spontaneous Ventilation: absent    Sedation Level:  Deep  Preoxygenated: Yes    Patient Position:  Sniffing  Mask Difficulty Assessment:  0 - not attempted  Final Airway Type:  Endotracheal airway  Final Endotracheal Airway:  ETT  Cuffed: Yes    Technique Used for Successful ETT Placement:  Direct laryngoscopy    Insertion Site:  Oral  Blade Type:  Marquise  Laryngoscope Blade/Videolaryngoscope Blade Size:  3  ETT Size (mm):  7.0  Measured from:  Teeth  ETT to Teeth (cm):  21  Placement Verified by: auscultation and capnometry    Cormack-Lehane Classification:  Grade I - full view of glottis  Number of Attempts at Approach:  1

## 2021-12-31 NOTE — ED NOTES
Pt medicated per MAR, education provided on medication, pt verbalized understanding. NAD noted.  at bedside

## 2021-12-31 NOTE — CONSULTS
CHIEF COMPLAINT: Acute cholecystitis    HISTORY OF PRESENT ILLNESS: The patient is  A 51 year-old   who was in her usual state of health until he came to the emergency room yesterday with abdominal pain.  Patient had a work-up which showeD significant gallstones and transaminitis with suspect CBD.  MRI was negative.  Consult is for a laparoscopic cholecystectomy.    PAST MEDICAL HISTORY:  has a past medical history of Advanced maternal age in pregnancy (2013), History of  (2013), Hypertension, Kidney disease, Kidney stones, Migraine, TOXOPLASMOSIS, Toxoplasmosis (acquired) (10 years ago in [place]), and Vertigo.     PAST SURGICAL HISTORY:  has a past surgical history that includes primary c section () and repeat c section w tubal ligation (2013).    ALLERGIES:   Allergies   Allergen Reactions   • Nkda [No Known Drug Allergy]        CURRENT MEDICATIONS:   Home Medications     Reviewed by Laurie Matamoros R.N. (Registered Nurse) on 21 at 1103  Med List Status: Complete   Medication Last Dose Status   bisacodyl (DULCOLAX) suppository 10 mg  Active   dextrose 50% (D50W) injection 50 mL  Active   heparin injection 5,000 Units  Active   HYDROmorphone (Dilaudid) injection 0.25 mg  Active   insulin regular (HumuLIN R,NovoLIN R) injection  Active   labetalol (NORMODYNE/TRANDATE) injection 10 mg  Active   magnesium hydroxide (MILK OF MAGNESIA) suspension 30 mL  Active   metFORMIN (GLUCOPHAGE) 500 MG Tab ~1 WEEK Active   NS infusion  Active   ondansetron (ZOFRAN ODT) dispertab 4 mg  Active   ondansetron (ZOFRAN) syringe/vial injection 4 mg  Active   oxyCODONE immediate-release (ROXICODONE) tablet 2.5 mg  Active   oxyCODONE immediate-release (ROXICODONE) tablet 5 mg  Active   Pharmacy Consult Request ...Pain Management Review 1 Each  Active   piperacillin-tazobactam (ZOSYN) 4.5 g in  mL IVPB  Active   polyethylene glycol/lytes (MIRALAX) PACKET 1 Packet  Active   prochlorperazine  (COMPAZINE) injection 5-10 mg  Active   promethazine (PHENERGAN) suppository 12.5-25 mg  Active   promethazine (PHENERGAN) tablet 12.5-25 mg  Active   senna-docusate (PERICOLACE or SENOKOT S) 8.6-50 MG per tablet 2 Tablet  Active                FAMILY HISTORY: family history includes Alcohol/Drug in her father; Diabetes in her father, paternal grandfather, and paternal grandmother; Hypertension in her mother.    SOCIAL HISTORY:  reports that she has never smoked. She has never used smokeless tobacco. She reports that she does not drink alcohol and does not use drugs.    REVIEW OF SYSTEMS: Review of systems is remarkable for the following Abdminal Pain with nausea. The remainder of the comprehensive ROS is negative with the exception of the aforementioned HPI, PMH, and PSH bullets in accordance with CMS guideline.    PHYSICAL EXAMINATION:      Constitutional:     Vital Signs: BP (!) 177/92   Pulse 72   Temp 35.8 °C (96.5 °F) (Temporal)   Resp 18   Ht 1.524 m (5')   Wt 96.1 kg (211 lb 13.8 oz)   SpO2 94%    General Appearance: The patient is a pleasant  and cooperative woman in no critical distress.  HEENT:    No jaundice or icterus.  Normocephalic and atraumatic  Neck:    Supple with midline trachea  Respiratory:   Inspection: Unlabored respirations, no intercostal retractions, paradoxical motion, or accessory muscle use.   Auscultation: normal.  Cardiovascular:   Inspection: The skin is warm and well purfused.  Auscultation: Regular rate and rhythm.   Peripheral Pulses: Normal.   Abdomen:   Inspection: Abdominal inspection reveals no abrasions, contusions, lacerations or penetrating wounds.  Scar from prior    Palpation: Palpation is remarkable for right upper quadrant tenderness with small amount of rebound.  Musculoskeletal:  Skin:    Examination of the skin reveals no jaundice or lesions.  Neurologic:   Neurologic examination reveals no focal deficits noted.  LABORATORY VALUES:   Recent Labs      12/30/21  2321   WBC 13.3*   RBC 5.09   HEMOGLOBIN 13.3   HEMATOCRIT 42.7   MCV 83.9   MCH 26.1*   MCHC 31.1*   RDW 48.2   PLATELETCT 347   MPV 9.6     Recent Labs     12/30/21  2321   SODIUM 134*   POTASSIUM 3.6   CHLORIDE 98   CO2 24   GLUCOSE 182*   BUN 14   CREATININE 0.49*   CALCIUM 9.8     Recent Labs     12/30/21  2321   ASTSGOT 328*   ALTSGPT 232*   TBILIRUBIN 1.4   ALKPHOSPHAT 590*   GLOBULIN 3.7*            IMAGING:   [id number]   Final Result      There is a large, 3 cm stone filling the gallbladder. No choledocholithiasis.      US-RUQ   Final Result      1.  There is a wall echo shadow sign of the gallbladder which may be due to a gallbladder filled with stones or porcelain gallbladder.   2.  Common bile duct is dilated measuring up to 1.2 cm.   3.  Hepatic steatosis.          ASSESSMENT AND PLAN:   51-year-old obese female diabetic with what appears to be acute cholecystitis with transaminitis but no obvious biliary obstruction on MRCP    Patient is a candidate for laparoscopic cholecystectomy.  I discussed the procedure with the patient including its attendant risks which include but are not limited to: Bleeding, organ space injury, wound infection, bile duct leak, conversion open procedure.  Also, her pulmonary complications from anesthesia.  Likely patient can be started on a diet after laparoscopic cholecystectomy today.  We will keep her overnight and recheck labs in the morning.  If her transaminases are improving then she can be discharged home tomorrow.      Right upper quadrant abdominal pain  Secondary to #1, symptomatic management, follow-up MRCP and GI consult    Choledocholithiasis  Zosyn, IV fluid, symptomatic management, follow-up MRCP and GI consult           ____________________________________     Shahid Cunha D.O.    DD: 12/31/2021  11:44 AM

## 2021-12-31 NOTE — CONSULTS
Gastroenterology Consult Note     Date of Consult:   Referring Physician: Davis Fields      Reason for consult: elevated LFTs, abdom pain        HPI: Mrs. Islas is a 52 y/o who presented with acute onset of epigastric and RUQ pains.  She denies any fevers/chills, or any N/V.  She was found to have acute elevation of her LFTs (compared with normal values in 2019) with , , Alk phos 590, T. Bili 1.4.  She was found on U/S to have gallstones with possible porcelain gallbladder, with associated dilation of CBD to 1.2 cm.  Subsequent MRCP demonstrated a 3 cm gallstone, a CBD maximal diameter of 2.9 cm, and no choledocholithiasis.  She denies any current pain.  Denies previous history of liver disease, specifically denying consumption of alcohol.  She has not had any hx of  abdominal surgeries.      PMHX:  Past Medical History:   Diagnosis Date   • Advanced maternal age in pregnancy 2013   • History of  2013   • Hypertension     during pregnancy     • Kidney disease    • Kidney stones     dx    • Migraine     dx at age 33yo    • TOXOPLASMOSIS     pt states in Right eye, tx in [place]/    • Toxoplasmosis (acquired) 10 years ago in [place]   • Vertigo     pt states during pregnancy .           PSurgHx:   Past Surgical History:   Procedure Laterality Date   • REPEAT C SECTION W TUBAL LIGATION  2013    Performed by Caro Ang M.D. at [facility]   • PRIMARY C SECTION      during pregnancy  HTN.         ALLERGIES:Nkda [no known drug allergy]     SocHx:   Social History     Socioeconomic History   • Marital status: Single     Spouse name: Not on file   • Number of children: Not on file   • Years of education: Not on file   • Highest education level: Not on file   Occupational History   • Not on file   Tobacco Use   • Smoking status: Never Smoker   • Smokeless tobacco: Never Used   Vaping  Use   • Vaping Use: Never used   Substance and Sexual Activity   • Alcohol use: No   • Drug use: No   • Sexual activity: Yes     Partners: Male     Comment: None   Other Topics Concern   • Not on file   Social History Narrative   • Not on file     Social Determinants of Health     Financial Resource Strain:    • Difficulty of Paying Living Expenses: Not on file   Food Insecurity:    • Worried About Running Out of Food in the Last Year: Not on file   • Ran Out of Food in the Last Year: Not on file   Transportation Needs:    • Lack of Transportation (Medical): Not on file   • Lack of Transportation (Non-Medical): Not on file   Physical Activity:    • Days of Exercise per Week: Not on file   • Minutes of Exercise per Session: Not on file   Stress:    • Feeling of Stress : Not on file   Social Connections:    • Frequency of Communication with Friends and Family: Not on file   • Frequency of Social Gatherings with Friends and Family: Not on file   • Attends [employer] Services: Not on file   • Active Member of Clubs or Organizations: Not on file   • Attends Club or Organization Meetings: Not on file   • Marital Status: Not on file   Intimate Partner Violence:    • Fear of Current or Ex-Partner: Not on file   • Emotionally Abused: Not on file   • Physically Abused: Not on file   • Sexually Abused: Not on file   Housing Stability:    • Unable to Pay for Housing in the Last Year: Not on file   • Number of Places Lived in the Last Year: Not on file   • Unstable Housing in the Last Year: Not on file        FAMHx:   Family History   Problem Relation Age of Onset   • Hypertension Mother    • Diabetes Father         Pills   • Alcohol/Drug Father    • Diabetes Paternal Grandmother         Pills   • Diabetes Paternal Grandfather         Pills        ROS:  Constitutional: No fevers, chills, no night sweats, no weight changes  HEENT: no vision or hearing changes, no dry mouth, no change in smell  CARDIO: no palpitations, no  orthopnea, no chest pain  PULM: no cough, no shortness of breath  NEURO: no Seizures, no memory impairment, no change in sensation  GI: as above  : no dysuria, no hematuria  HEME: no anemia, no easy brusing  MUSCULOSKELETAL: no muscle aches, no back pain, no arthritis  PSYCH: no anxiety or depression  SKIN: no rashes     PE:  Vitals:    12/31/21 0257 12/31/21 0259 12/31/21 0359 12/31/21 0608   BP: (!) 165/80 151/76 133/62 151/79   Pulse: 86 82 67 68   Resp: (!) 25 20     Temp:       TempSrc:       SpO2: 96% 94% 99% 99%   Weight:       Height:         Gen: AAOx3, NAD, lying in bed.  Latvian speaking.  HEENT: PERRL, EOMI, nares patent, Mucous membranes moist  Neck: supple, no cervical or supraclavicular adenopathy  CVS: regular rhythm, normal rate, no MRG  Pulm: CTAB, no crackles  Abd: soft, Nd, NT, no guarding or rebound  Ext: no edema, normal sensation  NEURO: grossly normal, no weakness  Skin: warm, no rash  Psych: normal Affect, no anxiety     LABS:  Lab Results   Component Value Date/Time    SODIUM 134 (L) 12/30/2021 11:21 PM    POTASSIUM 3.6 12/30/2021 11:21 PM    CHLORIDE 98 12/30/2021 11:21 PM    CO2 24 12/30/2021 11:21 PM    GLUCOSE 182 (H) 12/30/2021 11:21 PM    BUN 14 12/30/2021 11:21 PM    CREATININE 0.49 (L) 12/30/2021 11:21 PM      Lab Results   Component Value Date/Time    WBC 13.3 (H) 12/30/2021 11:21 PM    RBC 5.09 12/30/2021 11:21 PM    HEMOGLOBIN 13.3 12/30/2021 11:21 PM    HEMATOCRIT 42.7 12/30/2021 11:21 PM    MCV 83.9 12/30/2021 11:21 PM    MCH 26.1 (L) 12/30/2021 11:21 PM    MCHC 31.1 (L) 12/30/2021 11:21 PM    MPV 9.6 12/30/2021 11:21 PM    NEUTSPOLYS 70.90 12/30/2021 11:21 PM    LYMPHOCYTES 23.30 12/30/2021 11:21 PM    MONOCYTES 4.10 12/30/2021 11:21 PM    EOSINOPHILS 0.80 12/30/2021 11:21 PM    BASOPHILS 0.40 12/30/2021 11:21 PM    HYPOCHROMIA 1+ 08/07/2013 11:05 AM        No results found for: PROTHROMBTM, INR   Recent Labs     12/30/21  2321   ASTSGOT 328*   ALTSGPT 232*   TBILIRUBIN 1.4    GLOBULIN 3.7*          Problem List Items Addressed This Visit     * (Principal) Choledocholithiasis - Primary     Zosyn, IV fluid, symptomatic management, follow-up MRCP and GI consult           Other Visit Diagnoses     Biliary calculus of other site with obstruction               ASSESSMENT:   1. Cholelithiasis - 3 cm stone on MRCP  2. Dilated CBD without choledocholithiasis - 1.2 cm on U/S, 2.9 cm on subsequent MRCP.  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 Elevated LFTS, c/w acute obstruction, extra-ductal vs intra-ductal.  4. Morbid obesity  5. Hx HTN     PLAN:   1. As there is no intraductal obstruction on MRCP, would recommend proceeding with cholecystectomy with IOC if this is technically possible. Follow LFTs.  Consider subsequent ERCP if positive findings on IOC.  Will follow for now.    Thank you for this consult.     Blu Banuelos MD

## 2021-12-31 NOTE — ED NOTES
Report to Pre-op RN, pt last ate last night.  Will send for transport as soon as COVID resulted.

## 2021-12-31 NOTE — PROGRESS NOTES
Hospital Medicine Daily Progress Note    Date of Service  2021    Chief Complaint  Darling Islas is a 51 y.o. female admitted 2021 with acute cholecystitis.    Hospital Course  Darling Islas is a 51 y.o. Bengali-speaking female who presented 2021 with past medical history of DM2, obesity and hypertension who presents with 1 week of intermittent epigastric and right upper quadrant pain that radiates to the back, pressure-like, worsening, severe at this time.  Patient also endorses nausea without emesis.  Patient denies black or bloody stools, diarrhea or urinary symptoms.  Patient reports that pain is worse with food.  Patient reports she still has her gallbladder.  Her work-up reveals LFT elevation, common bile duct dilatation and a CT concerning for porcelain gallbladder versus cholelithiasis.  Gastroenterologist was consulted recommending MRCP, she receives Zosyn and referred to the hospitalist for admission.     Interval Problem Update  :  used [id number] on speaker phone.  Patient states she is borderline diabetic.  I have ordered a glycohemoglobin level.  She came in for severe right upper quadrant pain nausea but no emesis.  She understands she does have a large gallstone 3 cm on MRCP that will need to be surgically removed by lap cholecystectomy.  I have contacted Dr. Cunha general surgeon regarding results.  No choledocholithiasis noted.  Patient has been n.p.o. since midnight IV fluids normal saline 125 an hour coag studies ordered Heparin held.  White count is elevated at 13.3 currently on Zosyn IV for acute cholecystitis.  Lipase within normal limits.  Patient had previous  without any difficulties.  EKG reviewed normal sinus rhythm rate of 80 with .  No chest pain or shortness of breath.    I have personally seen and examined the patient at bedside. I discussed the plan of care with patient and general  surgery.    Consultants/Specialty  general surgery    Code Status  Full Code    Disposition  Patient is not medically cleared for discharge.   Anticipate discharge to to home with close outpatient follow-up.  I have placed the appropriate orders for post-discharge needs.    Review of Systems  Review of Systems   Constitutional: Negative for chills, diaphoresis, fever and malaise/fatigue.   HENT: Negative for congestion and sore throat.    Eyes: Negative for pain and discharge.   Respiratory: Negative for cough, hemoptysis, sputum production, shortness of breath and wheezing.    Cardiovascular: Negative for chest pain, palpitations, claudication and leg swelling.   Gastrointestinal: Positive for abdominal pain and nausea. Negative for constipation, diarrhea, melena and vomiting.   Genitourinary: Negative for dysuria, frequency and urgency.   Musculoskeletal: Negative for back pain, joint pain, myalgias and neck pain.   Skin: Negative for itching and rash.   Neurological: Negative for dizziness, sensory change, speech change, focal weakness, loss of consciousness, weakness and headaches.   Endo/Heme/Allergies: Does not bruise/bleed easily.   Psychiatric/Behavioral: Negative for depression, substance abuse and suicidal ideas.        Physical Exam  Temp:  [35.8 °C (96.5 °F)-37.1 °C (98.8 °F)] 35.8 °C (96.5 °F)  Pulse:  [47-87] 72  Resp:  [16-25] 18  BP: (133-177)/(62-92) 177/92  SpO2:  [94 %-100 %] 94 %    Physical Exam  Vitals and nursing note reviewed.   Constitutional:       General: She is not in acute distress.     Appearance: She is well-developed. She is obese. She is not diaphoretic.   HENT:      Head: Normocephalic and atraumatic.      Nose: Nose normal.      Mouth/Throat:      Pharynx: No oropharyngeal exudate.   Eyes:      General: No scleral icterus.        Right eye: No discharge.         Left eye: No discharge.      Conjunctiva/sclera: Conjunctivae normal.      Pupils: Pupils are equal, round, and reactive  to light.   Neck:      Thyroid: No thyromegaly.      Vascular: No JVD.      Trachea: No tracheal deviation.   Cardiovascular:      Rate and Rhythm: Normal rate and regular rhythm.      Heart sounds: Normal heart sounds. No murmur heard.  No friction rub. No gallop.    Pulmonary:      Effort: Pulmonary effort is normal. No respiratory distress.      Breath sounds: Normal breath sounds. No stridor. No wheezing or rales.   Chest:      Chest wall: No tenderness.   Abdominal:      General: Bowel sounds are normal. There is no distension.      Palpations: Abdomen is soft. There is no mass.      Tenderness: There is abdominal tenderness (RUQ pain). There is guarding. There is no rebound.   Musculoskeletal:         General: No tenderness. Normal range of motion.      Cervical back: Normal range of motion and neck supple.   Lymphadenopathy:      Cervical: No cervical adenopathy.   Skin:     General: Skin is warm and dry.      Findings: No erythema or rash.   Neurological:      General: No focal deficit present.      Mental Status: She is alert and oriented to person, place, and time.      Cranial Nerves: No cranial nerve deficit.      Motor: No abnormal muscle tone.      Coordination: Coordination normal.   Psychiatric:         Mood and Affect: Mood normal.         Behavior: Behavior normal.         Thought Content: Thought content normal.         Judgment: Judgment normal.         Fluids    Intake/Output Summary (Last 24 hours) at 12/31/2021 1203  Last data filed at 12/31/2021 0400  Gross per 24 hour   Intake 1100 ml   Output --   Net 1100 ml       Laboratory  Recent Labs     12/30/21  2321   WBC 13.3*   RBC 5.09   HEMOGLOBIN 13.3   HEMATOCRIT 42.7   MCV 83.9   MCH 26.1*   MCHC 31.1*   RDW 48.2   PLATELETCT 347   MPV 9.6     Recent Labs     12/30/21  2321   SODIUM 134*   POTASSIUM 3.6   CHLORIDE 98   CO2 24   GLUCOSE 182*   BUN 14   CREATININE 0.49*   CALCIUM 9.8                   Imaging  [id number]   Final Result       There is a large, 3 cm stone filling the gallbladder. No choledocholithiasis.      US-RUQ   Final Result      1.  There is a wall echo shadow sign of the gallbladder which may be due to a gallbladder filled with stones or porcelain gallbladder.   2.  Common bile duct is dilated measuring up to 1.2 cm.   3.  Hepatic steatosis.           Assessment/Plan  * Calculus of gallbladder with acute cholecystitis without obstruction- (present on admission)  Assessment & Plan  Zosyn, IV fluid, symptomatic management.   MRCP with large 3cm gallstone seen, no biliary stone.  Dr. Cunha consulted this a.m. for lap gina.  Patient understands and is in agreement, discussed with .    Leukocytosis (leucocytosis)  Assessment & Plan  Secondary to acute cholecystitis  Source control lap gina 12/31.  Zosyn IV.    Transaminitis  Assessment & Plan  Secondary to large infected gallstone.    Class 3 severe obesity in adult (HCC)- (present on admission)  Assessment & Plan  Patient will need outpatient diet and exercise program.    Borderline diabetes mellitus- (present on admission)  Assessment & Plan  Check Hga1c.  hold home metformin  SSI since NPO for surgery.       VTE prophylaxis: SCDs/TEDs    I have performed a physical exam and reviewed and updated ROS and Plan today (12/31/2021). In review of yesterday's note (12/30/2021), there are no changes except as documented above.

## 2022-01-01 PROBLEM — I10 PRIMARY HYPERTENSION: Status: ACTIVE | Noted: 2022-01-01

## 2022-01-01 PROBLEM — E11.9 TYPE 2 DIABETES MELLITUS WITHOUT COMPLICATION, WITHOUT LONG-TERM CURRENT USE OF INSULIN (HCC): Status: ACTIVE | Noted: 2021-12-31

## 2022-01-01 LAB
ALBUMIN SERPL BCP-MCNC: 3.3 G/DL (ref 3.2–4.9)
ALBUMIN/GLOB SERPL: 1 G/DL
ALP SERPL-CCNC: 467 U/L (ref 30–99)
ALT SERPL-CCNC: 451 U/L (ref 2–50)
ANION GAP SERPL CALC-SCNC: 13 MMOL/L (ref 7–16)
AST SERPL-CCNC: 372 U/L (ref 12–45)
BASOPHILS # BLD AUTO: 0.3 % (ref 0–1.8)
BASOPHILS # BLD: 0.03 K/UL (ref 0–0.12)
BILIRUB SERPL-MCNC: 1.6 MG/DL (ref 0.1–1.5)
BUN SERPL-MCNC: 11 MG/DL (ref 8–22)
CALCIUM SERPL-MCNC: 8.6 MG/DL (ref 8.5–10.5)
CHLORIDE SERPL-SCNC: 103 MMOL/L (ref 96–112)
CO2 SERPL-SCNC: 21 MMOL/L (ref 20–33)
CREAT SERPL-MCNC: 0.6 MG/DL (ref 0.5–1.4)
EOSINOPHIL # BLD AUTO: 0 K/UL (ref 0–0.51)
EOSINOPHIL NFR BLD: 0 % (ref 0–6.9)
ERYTHROCYTE [DISTWIDTH] IN BLOOD BY AUTOMATED COUNT: 48.9 FL (ref 35.9–50)
GLOBULIN SER CALC-MCNC: 3.2 G/DL (ref 1.9–3.5)
GLUCOSE BLD-MCNC: 190 MG/DL (ref 65–99)
GLUCOSE BLD-MCNC: 216 MG/DL (ref 65–99)
GLUCOSE BLD-MCNC: 274 MG/DL (ref 65–99)
GLUCOSE SERPL-MCNC: 267 MG/DL (ref 65–99)
HCT VFR BLD AUTO: 36 % (ref 37–47)
HGB BLD-MCNC: 11.1 G/DL (ref 12–16)
IMM GRANULOCYTES # BLD AUTO: 0.17 K/UL (ref 0–0.11)
IMM GRANULOCYTES NFR BLD AUTO: 1.5 % (ref 0–0.9)
LYMPHOCYTES # BLD AUTO: 2.09 K/UL (ref 1–4.8)
LYMPHOCYTES NFR BLD: 18.2 % (ref 22–41)
MCH RBC QN AUTO: 25.8 PG (ref 27–33)
MCHC RBC AUTO-ENTMCNC: 30.8 G/DL (ref 33.6–35)
MCV RBC AUTO: 83.7 FL (ref 81.4–97.8)
MONOCYTES # BLD AUTO: 0.48 K/UL (ref 0–0.85)
MONOCYTES NFR BLD AUTO: 4.2 % (ref 0–13.4)
NEUTROPHILS # BLD AUTO: 8.72 K/UL (ref 2–7.15)
NEUTROPHILS NFR BLD: 75.8 % (ref 44–72)
NRBC # BLD AUTO: 0 K/UL
NRBC BLD-RTO: 0 /100 WBC
PLATELET # BLD AUTO: 264 K/UL (ref 164–446)
PMV BLD AUTO: 10.2 FL (ref 9–12.9)
POTASSIUM SERPL-SCNC: 3.7 MMOL/L (ref 3.6–5.5)
PROT SERPL-MCNC: 6.5 G/DL (ref 6–8.2)
RBC # BLD AUTO: 4.3 M/UL (ref 4.2–5.4)
SODIUM SERPL-SCNC: 137 MMOL/L (ref 135–145)
WBC # BLD AUTO: 11.5 K/UL (ref 4.8–10.8)

## 2022-01-01 PROCEDURE — 700111 HCHG RX REV CODE 636 W/ 250 OVERRIDE (IP): Performed by: INTERNAL MEDICINE

## 2022-01-01 PROCEDURE — 700101 HCHG RX REV CODE 250: Performed by: INTERNAL MEDICINE

## 2022-01-01 PROCEDURE — 99024 POSTOP FOLLOW-UP VISIT: CPT | Performed by: SURGERY

## 2022-01-01 PROCEDURE — 80053 COMPREHEN METABOLIC PANEL: CPT

## 2022-01-01 PROCEDURE — 700105 HCHG RX REV CODE 258: Performed by: INTERNAL MEDICINE

## 2022-01-01 PROCEDURE — 700102 HCHG RX REV CODE 250 W/ 637 OVERRIDE(OP): Performed by: INTERNAL MEDICINE

## 2022-01-01 PROCEDURE — A9270 NON-COVERED ITEM OR SERVICE: HCPCS | Performed by: INTERNAL MEDICINE

## 2022-01-01 PROCEDURE — 85025 COMPLETE CBC W/AUTO DIFF WBC: CPT

## 2022-01-01 PROCEDURE — 36415 COLL VENOUS BLD VENIPUNCTURE: CPT

## 2022-01-01 PROCEDURE — 770001 HCHG ROOM/CARE - MED/SURG/GYN PRIV*

## 2022-01-01 PROCEDURE — 82962 GLUCOSE BLOOD TEST: CPT | Mod: 91

## 2022-01-01 PROCEDURE — 99233 SBSQ HOSP IP/OBS HIGH 50: CPT | Performed by: INTERNAL MEDICINE

## 2022-01-01 RX ORDER — LISINOPRIL 5 MG/1
5 TABLET ORAL
Status: DISCONTINUED | OUTPATIENT
Start: 2022-01-01 | End: 2022-01-03

## 2022-01-01 RX ORDER — METRONIDAZOLE 500 MG/1
500 TABLET ORAL EVERY 8 HOURS
Status: DISCONTINUED | OUTPATIENT
Start: 2022-01-01 | End: 2022-01-05 | Stop reason: HOSPADM

## 2022-01-01 RX ADMIN — ENOXAPARIN SODIUM 40 MG: 40 INJECTION SUBCUTANEOUS at 13:50

## 2022-01-01 RX ADMIN — LABETALOL HYDROCHLORIDE 10 MG: 5 INJECTION, SOLUTION INTRAVENOUS at 04:01

## 2022-01-01 RX ADMIN — METRONIDAZOLE 500 MG: 500 TABLET ORAL at 13:51

## 2022-01-01 RX ADMIN — INSULIN HUMAN 3 UNITS: 100 INJECTION, SOLUTION PARENTERAL at 12:38

## 2022-01-01 RX ADMIN — LISINOPRIL 5 MG: 5 TABLET ORAL at 13:51

## 2022-01-01 RX ADMIN — LABETALOL HYDROCHLORIDE 10 MG: 5 INJECTION, SOLUTION INTRAVENOUS at 19:42

## 2022-01-01 RX ADMIN — DOCUSATE SODIUM 50 MG AND SENNOSIDES 8.6 MG 2 TABLET: 8.6; 5 TABLET, FILM COATED ORAL at 05:38

## 2022-01-01 RX ADMIN — DOCUSATE SODIUM 50 MG AND SENNOSIDES 8.6 MG 2 TABLET: 8.6; 5 TABLET, FILM COATED ORAL at 17:55

## 2022-01-01 RX ADMIN — INSULIN HUMAN 1 UNITS: 100 INJECTION, SOLUTION PARENTERAL at 05:41

## 2022-01-01 RX ADMIN — METRONIDAZOLE 500 MG: 500 TABLET ORAL at 21:34

## 2022-01-01 RX ADMIN — CEFTRIAXONE SODIUM 2 G: 2 INJECTION, POWDER, FOR SOLUTION INTRAMUSCULAR; INTRAVENOUS at 13:51

## 2022-01-01 RX ADMIN — PIPERACILLIN AND TAZOBACTAM 4.5 G: 4; .5 INJECTION, POWDER, LYOPHILIZED, FOR SOLUTION INTRAVENOUS; PARENTERAL at 05:37

## 2022-01-01 RX ADMIN — PIPERACILLIN AND TAZOBACTAM 4.5 G: 4; .5 INJECTION, POWDER, LYOPHILIZED, FOR SOLUTION INTRAVENOUS; PARENTERAL at 12:41

## 2022-01-01 RX ADMIN — INSULIN HUMAN 2 UNITS: 100 INJECTION, SOLUTION PARENTERAL at 17:47

## 2022-01-01 RX ADMIN — INSULIN HUMAN 1 UNITS: 100 INJECTION, SOLUTION PARENTERAL at 23:42

## 2022-01-01 ASSESSMENT — COGNITIVE AND FUNCTIONAL STATUS - GENERAL
DRESSING REGULAR LOWER BODY CLOTHING: A LITTLE
DAILY ACTIVITIY SCORE: 23
SUGGESTED CMS G CODE MODIFIER DAILY ACTIVITY: CI
SUGGESTED CMS G CODE MODIFIER MOBILITY: CH
MOBILITY SCORE: 24

## 2022-01-01 ASSESSMENT — ENCOUNTER SYMPTOMS
HEARTBURN: 0
DIARRHEA: 0
WEAKNESS: 0
NAUSEA: 0
SHORTNESS OF BREATH: 0
ABDOMINAL PAIN: 0
COUGH: 0
MYALGIAS: 0
CHILLS: 0
FEVER: 0

## 2022-01-01 ASSESSMENT — PAIN DESCRIPTION - PAIN TYPE
TYPE: SURGICAL PAIN

## 2022-01-01 NOTE — PROGRESS NOTES
DATE: 1/1/2022    Post Operative Day  1 laparoscopic cholecystectomy.    INTERVAL EVENTS:  No overnight events, no complaints of pain.  Tolerating clear liquids.  Passing flatus, no stool.    - Advance diet as tolerated  - Aggressive mobilization  - May discharge home from surgical perspective when labs stabilize    PHYSICAL EXAMINATION:  Vital Signs: /88   Pulse 69   Temp 36.8 °C (98.3 °F) (Temporal)   Resp 18   Ht 1.524 m (5')   Wt 96.1 kg (211 lb 13.8 oz)   SpO2 96%     Physical Exam  Vitals and nursing note reviewed.   Constitutional:       General: She is not in acute distress.     Appearance: She is obese. She is not toxic-appearing.   Cardiovascular:      Rate and Rhythm: Normal rate and regular rhythm.   Pulmonary:      Effort: Pulmonary effort is normal. No respiratory distress.   Abdominal:      General: Abdomen is protuberant. There is no distension.      Palpations: Abdomen is soft.      Tenderness: There is no abdominal tenderness.      Comments: 4 incision CDI   Neurological:      Mental Status: She is alert.       .    LABORATORY VALUES:   Recent Labs     12/30/21 2321   WBC 13.3*   RBC 5.09   HEMOGLOBIN 13.3   HEMATOCRIT 42.7   MCV 83.9   MCH 26.1*   MCHC 31.1*   RDW 48.2   PLATELETCT 347   MPV 9.6     Recent Labs     12/30/21 2321   SODIUM 134*   POTASSIUM 3.6   CHLORIDE 98   CO2 24   GLUCOSE 182*   BUN 14   CREATININE 0.49*   CALCIUM 9.8     Recent Labs     12/30/21 2321 12/31/21 1903   ASTSGOT 328*  --    ALTSGPT 232*  --    TBILIRUBIN 1.4  --    ALKPHOSPHAT 590*  --    GLOBULIN 3.7*  --    INR  --  0.96     Recent Labs     12/31/21 1903   APTT 24.0*   INR 0.96        IMAGING:   [id number]   Final Result      There is a large, 3 cm stone filling the gallbladder. No choledocholithiasis.      US-RUQ   Final Result      1.  There is a wall echo shadow sign of the gallbladder which may be due to a gallbladder filled with stones or porcelain gallbladder.   2.  Common bile duct  is dilated measuring up to 1.2 cm.   3.  Hepatic steatosis.          ASSESSMENT AND PLAN:     * Calculus of gallbladder with acute cholecystitis without obstruction- (present on admission)  Assessment & Plan  12/31 Declan Cunha, [facility] [place]         ____________________________________     Cristy PAUL Bauer P.A.-C.    DD: 1/1/2022  9:06 AM

## 2022-01-01 NOTE — ASSESSMENT & PLAN NOTE
12/31 Lap gina  1/2 Uptrend in LFTs and bilirubin  1/2 ERCP  1/3 home if tolerating diet, follow up Alphonse 2-3 weeks   1/4 home today ok from surgical standpoint, bili resolved  1/5 cleared from surgical perspective, will drop to standby.   Dr. Shahid Cunha, [facility] [place]

## 2022-01-01 NOTE — PROGRESS NOTES
Gastroenterology Progress Note     Author: Ashlee River M.D.   Date & Time Created: 1/1/2022 12:08 PM    Chief Complaint:  Abdominal pain and elevated LFTs    Interval History:  Patient underwent uncomplicated cholecystectomy for cholecystitis.  Doing well since surgery with no abdominal pain.  Tolerating diet.    Review of Systems:  Review of Systems   Constitutional: Negative for chills and fever.   Respiratory: Negative for cough.    Cardiovascular: Negative for chest pain.   Gastrointestinal: Negative for abdominal pain, diarrhea and heartburn.         Current Facility-Administered Medications:   •  [COMPLETED] piperacillin-tazobactam (ZOSYN) 4.5 g in  mL IVPB, 4.5 g, Intravenous, Once, Stopped at 12/31/21 0400 **AND** piperacillin-tazobactam (ZOSYN) 4.5 g in  mL IVPB, 4.5 g, Intravenous, Q8HRS, Nate Zafar M.D., Stopped at 01/01/22 0937  •  senna-docusate (PERICOLACE or SENOKOT S) 8.6-50 MG per tablet 2 Tablet, 2 Tablet, Oral, BID, 2 Tablet at 01/01/22 0538 **AND** polyethylene glycol/lytes (MIRALAX) PACKET 1 Packet, 1 Packet, Oral, QDAY PRN **AND** magnesium hydroxide (MILK OF MAGNESIA) suspension 30 mL, 30 mL, Oral, QDAY PRN **AND** bisacodyl (DULCOLAX) suppository 10 mg, 10 mg, Rectal, QDAY PRN, Nate Zafar M.D.  •  [Held by provider] heparin injection 5,000 Units, 5,000 Units, Subcutaneous, Q8HRS, Nate Zafar M.D.  •  Notify provider if pain remains uncontrolled, , , CONTINUOUS **AND** Use the Numeric Rating Scale (NRS), Varela-Baker Faces (WBF), or FLACC on regular floors and Critical-Care Pain Observation Tool (CPOT) on ICUs/Trauma to assess pain, , , CONTINUOUS **AND** Pulse Ox, , , CONTINUOUS **AND** Pharmacy Consult Request ...Pain Management Review 1 Each, 1 Each, Other, PHARMACY TO DOSE **AND** If patient difficult to arouse and/or has respiratory depression (respiratory rate of 10 or less), stop any opiates that are currently infusing and call a Rapid Response., , ,  CONTINUOUS, Nate Zafar M.D.  •  oxyCODONE immediate-release (ROXICODONE) tablet 2.5 mg, 2.5 mg, Oral, Q3HRS PRN **OR** oxyCODONE immediate-release (ROXICODONE) tablet 5 mg, 5 mg, Oral, Q3HRS PRN **OR** HYDROmorphone (Dilaudid) injection 0.25 mg, 0.25 mg, Intravenous, Q3HRS PRN, Nate Zafar M.D.  •  labetalol (NORMODYNE/TRANDATE) injection 10 mg, 10 mg, Intravenous, Q4HRS PRN, Nate Zafar M.D., 10 mg at 01/01/22 0401  •  ondansetron (ZOFRAN) syringe/vial injection 4 mg, 4 mg, Intravenous, Q4HRS PRN, Nate Zafar M.D.  •  ondansetron (ZOFRAN ODT) dispertab 4 mg, 4 mg, Oral, Q4HRS PRN, Nate Zafar M.D.  •  promethazine (PHENERGAN) tablet 12.5-25 mg, 12.5-25 mg, Oral, Q4HRS PRN, Nate Zafar M.D.  •  promethazine (PHENERGAN) suppository 12.5-25 mg, 12.5-25 mg, Rectal, Q4HRS PRN, Nate Zafar M.D.  •  prochlorperazine (COMPAZINE) injection 5-10 mg, 5-10 mg, Intravenous, Q4HRS PRN, Nate Zafar M.D.  •  insulin regular (HumuLIN R,NovoLIN R) injection, 1-6 Units, Subcutaneous, Q6HRS, 1 Units at 01/01/22 0541 **AND** POC blood glucose manual result, , , Q6H **AND** NOTIFY MD and PharmD, , , Once **AND** Administer 20 grams of glucose (approximately 8 ounces of fruit juice) every 15 minutes PRN FSBG less than 70 mg/dL, , , PRN **AND** dextrose 50% (D50W) injection 50 mL, 50 mL, Intravenous, Q15 MIN PRN, Atiya Thompson M.D.     Physical Exam:  Vitals:    01/01/22 0746   BP: 148/88   Pulse: 69   Resp: 18   Temp: 36.8 °C (98.3 °F)   SpO2: 96%        Physical Exam  Constitutional:       General: She is not in acute distress.     Appearance: Normal appearance. She is obese.   HENT:      Head: Normocephalic and atraumatic.      Mouth/Throat:      Pharynx: No posterior oropharyngeal erythema.   Eyes:      General: No scleral icterus.  Abdominal:      General: Abdomen is flat. There is no distension.      Palpations: Abdomen is soft.      Tenderness: There is no abdominal tenderness.   Skin:      Coloration: Skin is not jaundiced.   Neurological:      Mental Status: She is alert.         Labs:          Recent Labs     12/30/21 2321 01/01/22  0945   SODIUM 134* 137   POTASSIUM 3.6 3.7   CHLORIDE 98 103   CO2 24 21   BUN 14 11   CREATININE 0.49* 0.60   CALCIUM 9.8 8.6     Recent Labs     12/30/21 2321 01/01/22  0945   ALTSGPT 232* 451*   ASTSGOT 328* 372*   ALKPHOSPHAT 590* 467*   TBILIRUBIN 1.4 1.6*   LIPASE 38  --    GLUCOSE 182* 267*     Recent Labs     12/30/21 2321 12/31/21  1903 01/01/22  0945   RBC 5.09  --  4.30   HEMOGLOBIN 13.3  --  11.1*   HEMATOCRIT 42.7  --  36.0*   PLATELETCT 347  --  264   PROTHROMBTM  --  12.5  --    APTT  --  24.0*  --    INR  --  0.96  --      Recent Labs     12/30/21 2321 01/01/22  0945   WBC 13.3* 11.5*   NEUTSPOLYS 70.90 75.80*   LYMPHOCYTES 23.30 18.20*   MONOCYTES 4.10 4.20   EOSINOPHILS 0.80 0.00   BASOPHILS 0.40 0.30   ASTSGOT 328* 372*   ALTSGPT 232* 451*   ALKPHOSPHAT 590* 467*   TBILIRUBIN 1.4 1.6*       Imaging:  [id number]   Final Result      There is a large, 3 cm stone filling the gallbladder. No choledocholithiasis.      US-RUQ   Final Result      1.  There is a wall echo shadow sign of the gallbladder which may be due to a gallbladder filled with stones or porcelain gallbladder.   2.  Common bile duct is dilated measuring up to 1.2 cm.   3.  Hepatic steatosis.           Assessment:  1.  Acute cholecystitis, status post cholecystectomy  2.  Elevated LFTs with dilated common bile duct but no definite evidence of choledocholithiasis by MRCP.  LFTs are trending slightly higher though she remains completely symptom-free.  We will proceed with ERCP tomorrow if LFTs continue to trend up.  Discussed ERCP procedure, indications and risks including risk of perforation, bleeding, pancreatitis with the patient through .  All questions answered.  Patient wishes to proceed if deemed necessary based on labs tomorrow.      Plan:  1.  Repeat LFTs  tomorrow  2.  ERCP based on labs  3.  N.p.o. after midnight

## 2022-01-01 NOTE — PROGRESS NOTES
Bedside report received.  Assessment complete.  A&O x 4. Patient calls appropriately.  Patient ambulates with x1 assist. Bed alarm off.   Patient has 0/10 pain.  Denies N&V. Tolerating clear liquid diet.  4 lap sites with dermabond.  + void, + flatus, - BM.  Patient denies SOB.  SCD's on.  Patient interaction used with an .  Review plan with of care with patient. Call light and personal belongings within reach. Hourly rounding in place. All needs met at this time.

## 2022-01-01 NOTE — PROGRESS NOTES
Received report from PAC-U.  Pt arrived to T408 bed 2  No immediate distress.  A&Ox4  Denies pain and n/v  Pt drowsy, but awakes easily  Oriented to room, educated on white board, TV, call light, call before fall, and plan of care  Call light and personal belongings within reach.  Fall precautions and hourly rounding in place  Family at bedside  [place] speaking

## 2022-01-01 NOTE — CARE PLAN
The patient is Stable - Low risk of patient condition declining or worsening    Shift Goals  Clinical Goals: ensure all pt needs are met due to language barrier  Patient Goals: rest    Progress made toward(s) clinical / shift goals:       Problem: Knowledge Deficit - Standard  Goal: Patient and family/care givers will demonstrate understanding of plan of care, disease process/condition, diagnostic tests and medications  Outcome: Progressing     Problem: Pain - Standard  Goal: Alleviation of pain or a reduction in pain to the patient’s comfort goal  Outcome: Progressing  Pt has not complained of pain       Patient is not progressing towards the following goals:

## 2022-01-01 NOTE — CARE PLAN
Problem: Knowledge Deficit - Standard  Goal: Patient and family/care givers will demonstrate understanding of plan of care, disease process/condition, diagnostic tests and medications  1/1/2022 1426 by Bobbi Bowers R.N.  Outcome: Progressing  1/1/2022 1425 by Bobbi Bowers R.N.  Outcome: Progressing  1/1/2022 1424 by RAEGAN AnglinN.  Outcome: Progressing     Problem: Pain - Standard  Goal: Alleviation of pain or a reduction in pain to the patient’s comfort goal  1/1/2022 1426 by GROVER Anglin.N.  Outcome: Progressing  1/1/2022 1425 by RAEGAN AnglinN.  Outcome: Progressing  1/1/2022 1424 by Bobbi Bowers R.N.  Outcome: Progressing   The patient is Stable - Low risk of patient condition declining or worsening    Shift Goals  Clinical Goals: pain control, ambulate  Patient Goals: rest    Progress made toward(s) clinical / shift goals:  denies pain and nausea at this time,  used as needed, ambulating in hallway with SBA    Patient is not progressing towards the following goals:

## 2022-01-01 NOTE — PROGRESS NOTES
Hospital Medicine Daily Progress Note    Date of Service  1/1/2022    Chief Complaint  Darling Islas is a 51 y.o. female admitted 12/30/2021 with abd pain    Hospital Course  52 yo woman with obesity and HTN who presented with abd pain and nausea. RUQ US showed possible porcelain gallbladder and dilated CBD. GI and surgery were consulted. MRCP showed 3cm stone in GB, no choledocholithiasis. Patient underwent cholecystectomy with Dr. Cunha 12/31/21.     Interval Problem Update  1/1 - LFTs are increased, T bili 1.6. She denies any abd pain or nausea, feels well. Hypertensive. A1c 7.3% I reviewed DM diagnosis with her. She says she has family hx of DM but still in process of getting PCP.    I have personally seen and examined the patient at bedside. I discussed the plan of care with patient.    Consultants/Specialty  general surgery    Code Status  Full Code    Disposition  Patient is not medically cleared for discharge.   Anticipate discharge to to home with close outpatient follow-up.  I have placed the appropriate orders for post-discharge needs.    Review of Systems  Review of Systems   Constitutional: Negative for malaise/fatigue.   Respiratory: Negative for shortness of breath.    Cardiovascular: Negative for chest pain.   Gastrointestinal: Negative for abdominal pain and nausea.   Musculoskeletal: Negative for myalgias.   Neurological: Negative for weakness.   All other systems reviewed and are negative.       Physical Exam  Temp:  [36.7 °C (98 °F)-37.8 °C (100 °F)] 37.3 °C (99.1 °F)  Pulse:  [46-75] 75  Resp:  [14-23] 18  BP: (113-184)/(55-92) 155/85  SpO2:  [93 %-100 %] 95 %    Physical Exam  Vitals and nursing note reviewed.   Constitutional:       General: She is not in acute distress.     Appearance: She is obese. She is not toxic-appearing.   HENT:      Head: Normocephalic.      Mouth/Throat:      Mouth: Mucous membranes are moist.   Eyes:      General:         Right eye: No discharge.         Left  eye: No discharge.   Cardiovascular:      Rate and Rhythm: Normal rate and regular rhythm.   Pulmonary:      Effort: Pulmonary effort is normal. No respiratory distress.      Breath sounds: No wheezing or rales.   Abdominal:      Palpations: Abdomen is soft.      Tenderness: There is no guarding or rebound.      Comments: Minimal tenderness at surgical sites   Musculoskeletal:         General: No swelling.      Cervical back: Neck supple.   Skin:     General: Skin is warm and dry.   Neurological:      Mental Status: She is alert and oriented to person, place, and time.         Fluids    Intake/Output Summary (Last 24 hours) at 1/1/2022 1256  Last data filed at 1/1/2022 0900  Gross per 24 hour   Intake 739 ml   Output --   Net 739 ml       Laboratory  Recent Labs     12/30/21 2321 01/01/22  0945   WBC 13.3* 11.5*   RBC 5.09 4.30   HEMOGLOBIN 13.3 11.1*   HEMATOCRIT 42.7 36.0*   MCV 83.9 83.7   MCH 26.1* 25.8*   MCHC 31.1* 30.8*   RDW 48.2 48.9   PLATELETCT 347 264   MPV 9.6 10.2     Recent Labs     12/30/21 2321 01/01/22  0945   SODIUM 134* 137   POTASSIUM 3.6 3.7   CHLORIDE 98 103   CO2 24 21   GLUCOSE 182* 267*   BUN 14 11   CREATININE 0.49* 0.60   CALCIUM 9.8 8.6     Recent Labs     12/31/21  1903   APTT 24.0*   INR 0.96               Imaging  [id number]   Final Result      There is a large, 3 cm stone filling the gallbladder. No choledocholithiasis.      US-RUQ   Final Result      1.  There is a wall echo shadow sign of the gallbladder which may be due to a gallbladder filled with stones or porcelain gallbladder.   2.  Common bile duct is dilated measuring up to 1.2 cm.   3.  Hepatic steatosis.           Assessment/Plan  * Calculus of gallbladder with acute cholecystitis without obstruction- (present on admission)  Assessment & Plan  Zosyn, IV fluid, symptomatic management.   MRCP with large 3cm gallstone seen, no biliary stone.  S/p labp gina 12/31  Recovering well  Diet ordered    Primary  hypertension  Assessment & Plan  Consistently hypertensive  Also with DM  Will start on lisinopril and monitor BMP    Leukocytosis (leucocytosis)  Assessment & Plan  Secondary to acute cholecystitis  Source control lap gina 12/31.  Improved  Complete 5 days antibiotics, CTX+flagyl    Transaminitis  Assessment & Plan  Secondary to large infected gallstone.  Increased after surgery  Will continue to monitor and recheck tomorrow    Class 3 severe obesity in adult (HCC)- (present on admission)  Assessment & Plan  Patient will need outpatient diet and exercise program.    Type 2 diabetes mellitus without complication, without long-term current use of insulin (HCC)- (present on admission)  Assessment & Plan  A1c 7.3%  I discussed diagnosis with her, she would like to start medication and she will set up PCP for follow up  Can start metformin on discharge, I discussed side effects with her  DM education ordered       VTE prophylaxis: enoxaparin ppx    I have performed a physical exam and reviewed and updated ROS and Plan today (1/1/2022). In review of yesterday's note (12/31/2021), there are no changes except as documented above.

## 2022-01-02 ENCOUNTER — APPOINTMENT (OUTPATIENT)
Dept: RADIOLOGY | Facility: MEDICAL CENTER | Age: 52
DRG: 418 | End: 2022-01-02
Attending: INTERNAL MEDICINE
Payer: MEDICAID

## 2022-01-02 ENCOUNTER — ANESTHESIA EVENT (OUTPATIENT)
Dept: SURGERY | Facility: MEDICAL CENTER | Age: 52
DRG: 418 | End: 2022-01-02
Payer: MEDICAID

## 2022-01-02 ENCOUNTER — APPOINTMENT (OUTPATIENT)
Dept: RADIOLOGY | Facility: MEDICAL CENTER | Age: 52
DRG: 418 | End: 2022-01-02
Attending: SURGERY
Payer: MEDICAID

## 2022-01-02 ENCOUNTER — ANESTHESIA (OUTPATIENT)
Dept: SURGERY | Facility: MEDICAL CENTER | Age: 52
DRG: 418 | End: 2022-01-02
Payer: MEDICAID

## 2022-01-02 PROBLEM — K80.01 CALCULUS OF GALLBLADDER WITH ACUTE CHOLECYSTITIS AND OBSTRUCTION: Status: ACTIVE | Noted: 2021-12-31

## 2022-01-02 LAB
ALBUMIN SERPL BCP-MCNC: 3.7 G/DL (ref 3.2–4.9)
ALBUMIN/GLOB SERPL: 1.1 G/DL
ALP SERPL-CCNC: 510 U/L (ref 30–99)
ALT SERPL-CCNC: 514 U/L (ref 2–50)
ANION GAP SERPL CALC-SCNC: 11 MMOL/L (ref 7–16)
AST SERPL-CCNC: 448 U/L (ref 12–45)
BASOPHILS # BLD AUTO: 0.5 % (ref 0–1.8)
BASOPHILS # BLD: 0.07 K/UL (ref 0–0.12)
BILIRUB CONJ SERPL-MCNC: 1.9 MG/DL (ref 0.1–0.5)
BILIRUB INDIRECT SERPL-MCNC: 0.9 MG/DL (ref 0–1)
BILIRUB SERPL-MCNC: 2.8 MG/DL (ref 0.1–1.5)
BUN SERPL-MCNC: 11 MG/DL (ref 8–22)
CALCIUM SERPL-MCNC: 8.8 MG/DL (ref 8.5–10.5)
CHLORIDE SERPL-SCNC: 103 MMOL/L (ref 96–112)
CO2 SERPL-SCNC: 24 MMOL/L (ref 20–33)
CREAT SERPL-MCNC: 0.54 MG/DL (ref 0.5–1.4)
EOSINOPHIL # BLD AUTO: 0.03 K/UL (ref 0–0.51)
EOSINOPHIL NFR BLD: 0.2 % (ref 0–6.9)
ERYTHROCYTE [DISTWIDTH] IN BLOOD BY AUTOMATED COUNT: 49.4 FL (ref 35.9–50)
GLOBULIN SER CALC-MCNC: 3.4 G/DL (ref 1.9–3.5)
GLUCOSE BLD-MCNC: 167 MG/DL (ref 65–99)
GLUCOSE BLD-MCNC: 225 MG/DL (ref 65–99)
GLUCOSE BLD-MCNC: 232 MG/DL (ref 65–99)
GLUCOSE BLD-MCNC: 240 MG/DL (ref 65–99)
GLUCOSE SERPL-MCNC: 216 MG/DL (ref 65–99)
HCT VFR BLD AUTO: 37.4 % (ref 37–47)
HGB BLD-MCNC: 11.8 G/DL (ref 12–16)
IMM GRANULOCYTES # BLD AUTO: 0.11 K/UL (ref 0–0.11)
IMM GRANULOCYTES NFR BLD AUTO: 0.7 % (ref 0–0.9)
LYMPHOCYTES # BLD AUTO: 2.29 K/UL (ref 1–4.8)
LYMPHOCYTES NFR BLD: 15 % (ref 22–41)
MCH RBC QN AUTO: 26.2 PG (ref 27–33)
MCHC RBC AUTO-ENTMCNC: 31.6 G/DL (ref 33.6–35)
MCV RBC AUTO: 82.9 FL (ref 81.4–97.8)
MONOCYTES # BLD AUTO: 1.13 K/UL (ref 0–0.85)
MONOCYTES NFR BLD AUTO: 7.4 % (ref 0–13.4)
NEUTROPHILS # BLD AUTO: 11.66 K/UL (ref 2–7.15)
NEUTROPHILS NFR BLD: 76.2 % (ref 44–72)
NRBC # BLD AUTO: 0.02 K/UL
NRBC BLD-RTO: 0.1 /100 WBC
PLATELET # BLD AUTO: 296 K/UL (ref 164–446)
PMV BLD AUTO: 10.7 FL (ref 9–12.9)
POTASSIUM SERPL-SCNC: 3.5 MMOL/L (ref 3.6–5.5)
PROT SERPL-MCNC: 7.1 G/DL (ref 6–8.2)
RBC # BLD AUTO: 4.51 M/UL (ref 4.2–5.4)
SODIUM SERPL-SCNC: 138 MMOL/L (ref 135–145)
WBC # BLD AUTO: 15.3 K/UL (ref 4.8–10.8)

## 2022-01-02 PROCEDURE — A9270 NON-COVERED ITEM OR SERVICE: HCPCS | Performed by: INTERNAL MEDICINE

## 2022-01-02 PROCEDURE — 700111 HCHG RX REV CODE 636 W/ 250 OVERRIDE (IP): Performed by: EMERGENCY MEDICINE

## 2022-01-02 PROCEDURE — 160036 HCHG PACU - EA ADDL 30 MINS PHASE I: Performed by: INTERNAL MEDICINE

## 2022-01-02 PROCEDURE — 160035 HCHG PACU - 1ST 60 MINS PHASE I: Performed by: INTERNAL MEDICINE

## 2022-01-02 PROCEDURE — 770001 HCHG ROOM/CARE - MED/SURG/GYN PRIV*

## 2022-01-02 PROCEDURE — 700105 HCHG RX REV CODE 258: Performed by: INTERNAL MEDICINE

## 2022-01-02 PROCEDURE — 99233 SBSQ HOSP IP/OBS HIGH 50: CPT | Performed by: INTERNAL MEDICINE

## 2022-01-02 PROCEDURE — 160208 HCHG ENDO MINUTES - EA ADDL 1 MIN LEVEL 4: Performed by: INTERNAL MEDICINE

## 2022-01-02 PROCEDURE — 700101 HCHG RX REV CODE 250: Performed by: EMERGENCY MEDICINE

## 2022-01-02 PROCEDURE — 502240 HCHG MISC OR SUPPLY RC 0272: Performed by: INTERNAL MEDICINE

## 2022-01-02 PROCEDURE — 110371 HCHG SHELL REV 272: Performed by: INTERNAL MEDICINE

## 2022-01-02 PROCEDURE — 700105 HCHG RX REV CODE 258: Performed by: EMERGENCY MEDICINE

## 2022-01-02 PROCEDURE — 82248 BILIRUBIN DIRECT: CPT

## 2022-01-02 PROCEDURE — 0FC98ZZ EXTIRPATION OF MATTER FROM COMMON BILE DUCT, VIA NATURAL OR ARTIFICIAL OPENING ENDOSCOPIC: ICD-10-PCS | Performed by: INTERNAL MEDICINE

## 2022-01-02 PROCEDURE — 160048 HCHG OR STATISTICAL LEVEL 1-5: Performed by: INTERNAL MEDICINE

## 2022-01-02 PROCEDURE — 700102 HCHG RX REV CODE 250 W/ 637 OVERRIDE(OP): Performed by: INTERNAL MEDICINE

## 2022-01-02 PROCEDURE — 160203 HCHG ENDO MINUTES - 1ST 30 MINS LEVEL 4: Performed by: INTERNAL MEDICINE

## 2022-01-02 PROCEDURE — 160009 HCHG ANES TIME/MIN: Performed by: INTERNAL MEDICINE

## 2022-01-02 PROCEDURE — 85025 COMPLETE CBC W/AUTO DIFF WBC: CPT

## 2022-01-02 PROCEDURE — 700117 HCHG RX CONTRAST REV CODE 255: Performed by: INTERNAL MEDICINE

## 2022-01-02 PROCEDURE — 36415 COLL VENOUS BLD VENIPUNCTURE: CPT

## 2022-01-02 PROCEDURE — 76700 US EXAM ABDOM COMPLETE: CPT

## 2022-01-02 PROCEDURE — 78226 HEPATOBILIARY SYSTEM IMAGING: CPT

## 2022-01-02 PROCEDURE — 160002 HCHG RECOVERY MINUTES (STAT): Performed by: INTERNAL MEDICINE

## 2022-01-02 PROCEDURE — 82962 GLUCOSE BLOOD TEST: CPT | Mod: 91

## 2022-01-02 PROCEDURE — 99024 POSTOP FOLLOW-UP VISIT: CPT | Performed by: SURGERY

## 2022-01-02 PROCEDURE — 80053 COMPREHEN METABOLIC PANEL: CPT

## 2022-01-02 RX ORDER — SODIUM CHLORIDE, SODIUM LACTATE, POTASSIUM CHLORIDE, CALCIUM CHLORIDE 600; 310; 30; 20 MG/100ML; MG/100ML; MG/100ML; MG/100ML
INJECTION, SOLUTION INTRAVENOUS CONTINUOUS
Status: ACTIVE | OUTPATIENT
Start: 2022-01-02 | End: 2022-01-02

## 2022-01-02 RX ORDER — SODIUM CHLORIDE, SODIUM LACTATE, POTASSIUM CHLORIDE, AND CALCIUM CHLORIDE .6; .31; .03; .02 G/100ML; G/100ML; G/100ML; G/100ML
2000 INJECTION, SOLUTION INTRAVENOUS ONCE
Status: COMPLETED | OUTPATIENT
Start: 2022-01-02 | End: 2022-01-02

## 2022-01-02 RX ORDER — LABETALOL HYDROCHLORIDE 5 MG/ML
5 INJECTION, SOLUTION INTRAVENOUS
Status: DISCONTINUED | OUTPATIENT
Start: 2022-01-02 | End: 2022-01-02 | Stop reason: HOSPADM

## 2022-01-02 RX ORDER — MEPERIDINE HYDROCHLORIDE 25 MG/ML
6.25 INJECTION INTRAMUSCULAR; INTRAVENOUS; SUBCUTANEOUS
Status: DISCONTINUED | OUTPATIENT
Start: 2022-01-02 | End: 2022-01-02 | Stop reason: HOSPADM

## 2022-01-02 RX ORDER — HALOPERIDOL 5 MG/ML
1 INJECTION INTRAMUSCULAR
Status: DISCONTINUED | OUTPATIENT
Start: 2022-01-02 | End: 2022-01-02 | Stop reason: HOSPADM

## 2022-01-02 RX ORDER — ONDANSETRON 2 MG/ML
4 INJECTION INTRAMUSCULAR; INTRAVENOUS
Status: DISCONTINUED | OUTPATIENT
Start: 2022-01-02 | End: 2022-01-02 | Stop reason: HOSPADM

## 2022-01-02 RX ORDER — HYDROMORPHONE HYDROCHLORIDE 1 MG/ML
0.1 INJECTION, SOLUTION INTRAMUSCULAR; INTRAVENOUS; SUBCUTANEOUS
Status: DISCONTINUED | OUTPATIENT
Start: 2022-01-02 | End: 2022-01-02 | Stop reason: HOSPADM

## 2022-01-02 RX ORDER — DEXAMETHASONE SODIUM PHOSPHATE 4 MG/ML
INJECTION, SOLUTION INTRA-ARTICULAR; INTRALESIONAL; INTRAMUSCULAR; INTRAVENOUS; SOFT TISSUE PRN
Status: DISCONTINUED | OUTPATIENT
Start: 2022-01-02 | End: 2022-01-02 | Stop reason: SURG

## 2022-01-02 RX ORDER — INDOMETHACIN 50 MG/1
100 SUPPOSITORY RECTAL ONCE
Status: COMPLETED | OUTPATIENT
Start: 2022-01-02 | End: 2022-01-02

## 2022-01-02 RX ORDER — INDOMETHACIN 50 MG/1
100 SUPPOSITORY RECTAL ONCE
Status: DISCONTINUED | OUTPATIENT
Start: 2022-01-02 | End: 2022-01-02

## 2022-01-02 RX ORDER — ONDANSETRON 2 MG/ML
INJECTION INTRAMUSCULAR; INTRAVENOUS PRN
Status: DISCONTINUED | OUTPATIENT
Start: 2022-01-02 | End: 2022-01-02 | Stop reason: SURG

## 2022-01-02 RX ORDER — OXYCODONE HYDROCHLORIDE AND ACETAMINOPHEN 5; 325 MG/1; MG/1
1 TABLET ORAL
Status: DISCONTINUED | OUTPATIENT
Start: 2022-01-02 | End: 2022-01-02 | Stop reason: HOSPADM

## 2022-01-02 RX ORDER — HYDROMORPHONE HYDROCHLORIDE 1 MG/ML
0.2 INJECTION, SOLUTION INTRAMUSCULAR; INTRAVENOUS; SUBCUTANEOUS
Status: DISCONTINUED | OUTPATIENT
Start: 2022-01-02 | End: 2022-01-02 | Stop reason: HOSPADM

## 2022-01-02 RX ORDER — ROCURONIUM BROMIDE 10 MG/ML
INJECTION, SOLUTION INTRAVENOUS PRN
Status: DISCONTINUED | OUTPATIENT
Start: 2022-01-02 | End: 2022-01-02 | Stop reason: SURG

## 2022-01-02 RX ORDER — LIDOCAINE HYDROCHLORIDE 20 MG/ML
INJECTION, SOLUTION EPIDURAL; INFILTRATION; INTRACAUDAL; PERINEURAL PRN
Status: DISCONTINUED | OUTPATIENT
Start: 2022-01-02 | End: 2022-01-02 | Stop reason: SURG

## 2022-01-02 RX ORDER — SUCCINYLCHOLINE CHLORIDE 20 MG/ML
INJECTION INTRAMUSCULAR; INTRAVENOUS PRN
Status: DISCONTINUED | OUTPATIENT
Start: 2022-01-02 | End: 2022-01-02 | Stop reason: SURG

## 2022-01-02 RX ORDER — MIDAZOLAM HYDROCHLORIDE 1 MG/ML
INJECTION INTRAMUSCULAR; INTRAVENOUS PRN
Status: DISCONTINUED | OUTPATIENT
Start: 2022-01-02 | End: 2022-01-02 | Stop reason: SURG

## 2022-01-02 RX ORDER — SODIUM CHLORIDE, SODIUM LACTATE, POTASSIUM CHLORIDE, AND CALCIUM CHLORIDE .6; .31; .03; .02 G/100ML; G/100ML; G/100ML; G/100ML
500 INJECTION, SOLUTION INTRAVENOUS ONCE
Status: DISCONTINUED | OUTPATIENT
Start: 2022-01-02 | End: 2022-01-02 | Stop reason: HOSPADM

## 2022-01-02 RX ORDER — DEXTROSE MONOHYDRATE 25 G/50ML
50 INJECTION, SOLUTION INTRAVENOUS
Status: DISCONTINUED | OUTPATIENT
Start: 2022-01-02 | End: 2022-01-02 | Stop reason: HOSPADM

## 2022-01-02 RX ORDER — HYDRALAZINE HYDROCHLORIDE 20 MG/ML
5 INJECTION INTRAMUSCULAR; INTRAVENOUS
Status: DISCONTINUED | OUTPATIENT
Start: 2022-01-02 | End: 2022-01-02 | Stop reason: HOSPADM

## 2022-01-02 RX ORDER — OXYCODONE HYDROCHLORIDE AND ACETAMINOPHEN 5; 325 MG/1; MG/1
2 TABLET ORAL
Status: DISCONTINUED | OUTPATIENT
Start: 2022-01-02 | End: 2022-01-02 | Stop reason: HOSPADM

## 2022-01-02 RX ORDER — DIPHENHYDRAMINE HYDROCHLORIDE 50 MG/ML
12.5 INJECTION INTRAMUSCULAR; INTRAVENOUS
Status: DISCONTINUED | OUTPATIENT
Start: 2022-01-02 | End: 2022-01-02 | Stop reason: HOSPADM

## 2022-01-02 RX ORDER — HYDROMORPHONE HYDROCHLORIDE 1 MG/ML
0.4 INJECTION, SOLUTION INTRAMUSCULAR; INTRAVENOUS; SUBCUTANEOUS
Status: DISCONTINUED | OUTPATIENT
Start: 2022-01-02 | End: 2022-01-02 | Stop reason: HOSPADM

## 2022-01-02 RX ORDER — SODIUM CHLORIDE, SODIUM LACTATE, POTASSIUM CHLORIDE, CALCIUM CHLORIDE 600; 310; 30; 20 MG/100ML; MG/100ML; MG/100ML; MG/100ML
INJECTION, SOLUTION INTRAVENOUS
Status: DISCONTINUED | OUTPATIENT
Start: 2022-01-02 | End: 2022-01-02 | Stop reason: SURG

## 2022-01-02 RX ADMIN — PROPOFOL 100 MG: 10 INJECTION, EMULSION INTRAVENOUS at 14:02

## 2022-01-02 RX ADMIN — SODIUM CHLORIDE, POTASSIUM CHLORIDE, SODIUM LACTATE AND CALCIUM CHLORIDE: 600; 310; 30; 20 INJECTION, SOLUTION INTRAVENOUS at 20:35

## 2022-01-02 RX ADMIN — SUCCINYLCHOLINE CHLORIDE 160 MG: 20 INJECTION, SOLUTION INTRAMUSCULAR; INTRAVENOUS; PARENTERAL at 13:08

## 2022-01-02 RX ADMIN — MIDAZOLAM HYDROCHLORIDE 2 MG: 1 INJECTION, SOLUTION INTRAMUSCULAR; INTRAVENOUS at 13:05

## 2022-01-02 RX ADMIN — INSULIN HUMAN 2 UNITS: 100 INJECTION, SOLUTION PARENTERAL at 06:13

## 2022-01-02 RX ADMIN — SODIUM CHLORIDE, POTASSIUM CHLORIDE, SODIUM LACTATE AND CALCIUM CHLORIDE: 600; 310; 30; 20 INJECTION, SOLUTION INTRAVENOUS at 16:05

## 2022-01-02 RX ADMIN — INSULIN HUMAN 1 UNITS: 100 INJECTION, SOLUTION PARENTERAL at 23:49

## 2022-01-02 RX ADMIN — DEXAMETHASONE SODIUM PHOSPHATE 4 MG: 4 INJECTION, SOLUTION INTRA-ARTICULAR; INTRALESIONAL; INTRAMUSCULAR; INTRAVENOUS; SOFT TISSUE at 13:30

## 2022-01-02 RX ADMIN — LABETALOL HYDROCHLORIDE 10 MG: 5 INJECTION, SOLUTION INTRAVENOUS at 17:46

## 2022-01-02 RX ADMIN — HYDRALAZINE HYDROCHLORIDE 5 MG: 20 INJECTION INTRAMUSCULAR; INTRAVENOUS at 14:57

## 2022-01-02 RX ADMIN — ROCURONIUM BROMIDE 50 MG: 10 INJECTION, SOLUTION INTRAVENOUS at 13:15

## 2022-01-02 RX ADMIN — PROPOFOL 150 MG: 10 INJECTION, EMULSION INTRAVENOUS at 13:08

## 2022-01-02 RX ADMIN — METRONIDAZOLE 500 MG: 500 TABLET ORAL at 21:51

## 2022-01-02 RX ADMIN — SODIUM CHLORIDE, POTASSIUM CHLORIDE, SODIUM LACTATE AND CALCIUM CHLORIDE: 600; 310; 30; 20 INJECTION, SOLUTION INTRAVENOUS at 13:04

## 2022-01-02 RX ADMIN — DOCUSATE SODIUM 50 MG AND SENNOSIDES 8.6 MG 2 TABLET: 8.6; 5 TABLET, FILM COATED ORAL at 06:07

## 2022-01-02 RX ADMIN — GLUCAGON 0.5 MG: 1 INJECTION, POWDER, LYOPHILIZED, FOR SOLUTION INTRAMUSCULAR; INTRAVENOUS at 13:50

## 2022-01-02 RX ADMIN — METRONIDAZOLE 500 MG: 500 TABLET ORAL at 06:07

## 2022-01-02 RX ADMIN — FENTANYL CITRATE 100 MCG: 50 INJECTION, SOLUTION INTRAMUSCULAR; INTRAVENOUS at 13:07

## 2022-01-02 RX ADMIN — INDOMETHACIN 100 MG: 50 SUPPOSITORY RECTAL at 14:33

## 2022-01-02 RX ADMIN — LIDOCAINE HYDROCHLORIDE 100 MG: 20 INJECTION, SOLUTION EPIDURAL; INFILTRATION; INTRACAUDAL at 13:08

## 2022-01-02 RX ADMIN — LISINOPRIL 5 MG: 5 TABLET ORAL at 06:07

## 2022-01-02 RX ADMIN — SUGAMMADEX 200 MG: 100 INJECTION, SOLUTION INTRAVENOUS at 13:57

## 2022-01-02 RX ADMIN — ONDANSETRON 4 MG: 2 INJECTION INTRAMUSCULAR; INTRAVENOUS at 13:30

## 2022-01-02 ASSESSMENT — ENCOUNTER SYMPTOMS
FEVER: 0
MYALGIAS: 0
COUGH: 0
WEAKNESS: 0
DIARRHEA: 0
ABDOMINAL PAIN: 0
HEARTBURN: 0
ABDOMINAL PAIN: 1
NAUSEA: 0
SHORTNESS OF BREATH: 0
CHILLS: 0

## 2022-01-02 ASSESSMENT — PAIN DESCRIPTION - PAIN TYPE
TYPE: ACUTE PAIN
TYPE: SURGICAL PAIN
TYPE: ACUTE PAIN;SURGICAL PAIN
TYPE: ACUTE PAIN;SURGICAL PAIN
TYPE: SURGICAL PAIN
TYPE: ACUTE PAIN;SURGICAL PAIN
TYPE: SURGICAL PAIN
TYPE: ACUTE PAIN
TYPE: SURGICAL PAIN
TYPE: ACUTE PAIN;SURGICAL PAIN

## 2022-01-02 ASSESSMENT — PAIN SCALES - GENERAL: PAIN_LEVEL: 0

## 2022-01-02 NOTE — PROCEDURES
Procedure: Endoscopic Retrograde Cholangio Pancreatography (ERCP)    Date of procedure:  1/2/2022    Enodscopist: Ashlee River M.D.    Anesthesia: GETA    Anesthesiologist: Jaspal Haas M.D    Pre-op diagnosis:  Choledocholithiasis    Post-op diagnosis:  Choledocholithiasis    Complications: No immediate complications    Estimated blood loss: Less than 5 cc    Medications Administered:  Pre-op: Patient on scheduled dose of antibiotics, no additional preop antibiotics indicated.    Post-op: Indomethacin 100 mg [place] to reduce risk of pancreatitis      Indications:  51-year-old female underwent laparoscopic cholecystectomy for cholecystitis.  Though preop MRCP was negative, LFTs continue to increase in the postoperative period.  Therefore ERCP is being done for suspected biliary obstruction.      Description of procedure:  After discussion of indications,risks and benefits including the risks of perforation, bleeding, infection, pancreatitis and rare event of death from complications, informed consent was signed by the patient. General anesthesia was administered and documented by anesthesia. Time out was performed. Patient was placed in the prone position. Olympus side viewing duodenoscope was placed in the mouth and esophagus was intubated under direct visualization. The scope was passed through the esophagus and stomach through to the second portion of duodenum without a detailed examination of upper GI tract. The major ampulla was identified and the endoscope was positioned in en face view. Using cannulating sphincterotome Rx 44 with 0.035 soft tip Jag wire, wire guided cannulation was achieved without difficulty.Contrast was injected and cholangiogram was obtained. The contrast flowed to the bifurcation and into intrahepatic ducts. Image quality was adequate and I personally interpreted the images. Spot images were saved for documentation. Findings and maneuvers as listed below.After deflating the duodenum and  stomach,the endoscope was removed and procedure was terminated. Patient tolerated the procedure well without any immediate complications.    Findings:  1.Major ampulla: Normal    2. Bile ducts:  Common bile duct and intrahepatic ducts appeared to be normal in size.  Cholangiogram revealed the presence of small filling defects in the mid to distal common bile duct consistent with stones.  After biliary sphincterotomy, common bile duct was dredged and 3 stones ranging in size from 3 to 5 mm were removed.  Final occlusion cholangiogram revealed no retained filling defects and normal filling of intrahepatic ducts.  Cystic duct stump appeared intact with clips in place.    3. Pancreatic duct: Pancreatogram not obtained to  reduce risk of pancreatitis.    Plan:  1.NPO until tomorrow morning  2.IV Fluids:  RL: 2 L bolus, then maintenance at 300cc/h for 8 hrs.  3.  Monitor LFTs  4.  Advance diet in a.m. if free of abdominal pain.

## 2022-01-02 NOTE — PROGRESS NOTES
Hospital Medicine Daily Progress Note    Date of Service  1/2/2022    Chief Complaint  Darling Islas is a 51 y.o. female admitted 12/30/2021 with abd pain    Hospital Course  52 yo woman with obesity and HTN who presented with abd pain and nausea. RUQ US showed possible porcelain gallbladder and dilated CBD. GI and surgery were consulted. MRCP showed 3cm stone in GB, no choledocholithiasis. Patient underwent cholecystectomy with Dr. Cunha 12/31/21.     Interval Problem Update  1/1 - LFTs are increased, T bili 1.6. She denies any abd pain or nausea, feels well. Hypertensive. A1c 7.3% I reviewed DM diagnosis with her. She says she has family hx of DM but still in process of getting PCP.  1/2 - WBC and LFTs increased. She has mild abd discomfort. Denies nausea or SOB. ERCP today    I have personally seen and examined the patient at bedside. I discussed the plan of care with patient.    Consultants/Specialty  general surgery    Code Status  Full Code    Disposition  Patient is not medically cleared for discharge.   Anticipate discharge to to home with close outpatient follow-up.  I have placed the appropriate orders for post-discharge needs.    Review of Systems  Review of Systems   Constitutional: Negative for malaise/fatigue.   Respiratory: Negative for shortness of breath.    Cardiovascular: Negative for chest pain.   Gastrointestinal: Positive for abdominal pain. Negative for nausea.   Musculoskeletal: Negative for myalgias.   Neurological: Negative for weakness.   All other systems reviewed and are negative.       Physical Exam  Temp:  [36.7 °C (98.1 °F)-37.7 °C (99.9 °F)] 37.1 °C (98.8 °F)  Pulse:  [] 90  Resp:  [17-18] 17  BP: (147-198)/() 147/58  SpO2:  [90 %-96 %] 90 %    Physical Exam  Vitals and nursing note reviewed.   Constitutional:       General: She is not in acute distress.     Appearance: She is obese. She is not toxic-appearing.   HENT:      Head: Normocephalic.      Mouth/Throat:       Mouth: Mucous membranes are moist.   Eyes:      General:         Right eye: No discharge.         Left eye: No discharge.   Cardiovascular:      Rate and Rhythm: Normal rate and regular rhythm.   Pulmonary:      Effort: Pulmonary effort is normal. No respiratory distress.      Breath sounds: No wheezing or rales.   Abdominal:      Palpations: Abdomen is soft.      Tenderness: There is abdominal tenderness (lower abd). There is no guarding or rebound.   Musculoskeletal:         General: No swelling.      Cervical back: Neck supple.   Skin:     General: Skin is warm and dry.   Neurological:      Mental Status: She is alert and oriented to person, place, and time.         Fluids    Intake/Output Summary (Last 24 hours) at 1/2/2022 1031  Last data filed at 1/1/2022 1507  Gross per 24 hour   Intake 1200 ml   Output --   Net 1200 ml       Laboratory  Recent Labs     12/30/21 2321 01/01/22  0945 01/02/22  0418   WBC 13.3* 11.5* 15.3*   RBC 5.09 4.30 4.51   HEMOGLOBIN 13.3 11.1* 11.8*   HEMATOCRIT 42.7 36.0* 37.4   MCV 83.9 83.7 82.9   MCH 26.1* 25.8* 26.2*   MCHC 31.1* 30.8* 31.6*   RDW 48.2 48.9 49.4   PLATELETCT 347 264 296   MPV 9.6 10.2 10.7     Recent Labs     12/30/21 2321 01/01/22  0945 01/02/22  0418   SODIUM 134* 137 138   POTASSIUM 3.6 3.7 3.5*   CHLORIDE 98 103 103   CO2 24 21 24   GLUCOSE 182* 267* 216*   BUN 14 11 11   CREATININE 0.49* 0.60 0.54   CALCIUM 9.8 8.6 8.8     Recent Labs     12/31/21  1903   APTT 24.0*   INR 0.96               Imaging  US-ABDOMEN COMPLETE SURVEY   Final Result      1.  Diffuse fatty infiltration appearance of the liver. No hepatic mass or biliary dilatation identified.      2.  Cholecystectomy. Dilated common bile duct measuring 11.1 mm. No choledocholithiasis identified.      3.  Limited visualization of the pancreas, aorta, and IVC.         [id number]/O   Final Result      There is a large, 3 cm stone filling the gallbladder. No choledocholithiasis.      US-RUQ   Final  Result      1.  There is a wall echo shadow sign of the gallbladder which may be due to a gallbladder filled with stones or porcelain gallbladder.   2.  Common bile duct is dilated measuring up to 1.2 cm.   3.  Hepatic steatosis.      DX-PORTABLE FLUORO > 1 HOUR    (Results Pending)   NM-HEPATOBILIARY SCAN    (Results Pending)        Assessment/Plan  * Calculus of gallbladder with acute cholecystitis and obstruction- (present on admission)  Assessment & Plan  Zosyn, IV fluid, symptomatic management.  MRCP with large 3cm gallstone seen, no biliary stone.  S/p labp gina 12/31  LFTs increasing, ERCP with GI today  NPO    Primary hypertension  Assessment & Plan  Consistently hypertensive  Also with DM  Started on lisinopril and monitor BMP    Leukocytosis (leucocytosis)  Assessment & Plan  Secondary to acute cholecystitis  Source control lap gina 12/31. ERCP today  WBC increased  Continue CTX+flagyl    Transaminitis  Assessment & Plan  Secondary to large infected gallstone.  Increased after surgery  ERCP today  Trend CMP    Class 3 severe obesity in adult (HCC)- (present on admission)  Assessment & Plan  Patient will need outpatient diet and exercise program.    Type 2 diabetes mellitus without complication, without long-term current use of insulin (HCC)- (present on admission)  Assessment & Plan  A1c 7.3%  I discussed diagnosis with her, she would like to start medication and she will set up PCP for follow up  Can start metformin on discharge, I discussed side effects with her  DM education ordered       VTE prophylaxis: enoxaparin ppx    I have performed a physical exam and reviewed and updated ROS and Plan today (1/2/2022). In review of yesterday's note (1/1/2022), there are no changes except as documented above.

## 2022-01-02 NOTE — DIETARY
Nutrition Services: Day 2 of admit.  Darling Islas is a 51 y.o. female with admitting DX of Choledocholithiasis  Consult received for DM diet education.  Reviewed chart. Pt previously with pre-diabetes, had HA1c of 6% in 2019, and was on glucophage. HA1c now 7.3%.  Attempted visit with pt, but she was not in the room (presumably @ ERCP). Left [place] DM booklet and additional dietary handouts.   RD available as further indicated.   Also of note, BMI >40. Recommend outpatient weight management services.

## 2022-01-02 NOTE — ANESTHESIA PROCEDURE NOTES
Airway    Date/Time: 1/2/2022 1:10 PM  Performed by: Jaspal Haas M.D.  Authorized by: Jaspal Haas M.D.     Location:  OR  Urgency:  Elective  Difficult Airway: No    Indications for Airway Management:  Anesthesia      Spontaneous Ventilation: absent    Sedation Level:  Deep  Preoxygenated: Yes    Patient Position:  Sniffing  Mask Difficulty Assessment:  2 - vent by mask + OA or adjuvant +/- NMBA  Final Airway Type:  Endotracheal airway  Final Endotracheal Airway:  ETT  Cuffed: Yes    Technique Used for Successful ETT Placement:  Direct laryngoscopy    Insertion Site:  Oral  Blade Type:  [place]  Laryngoscope Blade/Videolaryngoscope Blade Size:  2  ETT Size (mm):  7.0  Measured from:  Teeth  ETT to Teeth (cm):  19  Placement Verified by: auscultation and capnometry    Cormack-Lehane Classification:  Grade I - full view of glottis  Number of Attempts at Approach:  1

## 2022-01-02 NOTE — PROGRESS NOTES
DATE: 1/2/2022    Post Operative Day  2 laparoscopic cholecystectomy.    INTERVAL EVENTS:  LFTs and bilirubin continue to increase  NPO for possible procedure today.  Passing flatus, no stool.    - HIDA scan ordered  - ERCP tentatively scheduled pending results of HIDA    PHYSICAL EXAMINATION:  Vital Signs: /58   Pulse 90   Temp 37.1 °C (98.8 °F) (Temporal)   Resp 17   Ht 1.524 m (5')   Wt 96.1 kg (211 lb 13.8 oz)   SpO2 90%     Physical Exam  Vitals and nursing note reviewed.   Constitutional:       General: She is not in acute distress.     Appearance: She is obese. She is not toxic-appearing.   Cardiovascular:      Rate and Rhythm: Normal rate and regular rhythm.   Pulmonary:      Effort: Pulmonary effort is normal. No respiratory distress.   Abdominal:      General: Abdomen is protuberant. There is no distension.      Palpations: Abdomen is soft.      Tenderness: There is no abdominal tenderness.      Comments: 4 incision CDI   Neurological:      Mental Status: She is alert.       .    LABORATORY VALUES:   Recent Labs     12/30/21 2321 01/01/22  0945 01/02/22 0418   WBC 13.3* 11.5* 15.3*   RBC 5.09 4.30 4.51   HEMOGLOBIN 13.3 11.1* 11.8*   HEMATOCRIT 42.7 36.0* 37.4   MCV 83.9 83.7 82.9   MCH 26.1* 25.8* 26.2*   MCHC 31.1* 30.8* 31.6*   RDW 48.2 48.9 49.4   PLATELETCT 347 264 296   MPV 9.6 10.2 10.7     Recent Labs     12/30/21 2321 01/01/22  0945 01/02/22  0418   SODIUM 134* 137 138   POTASSIUM 3.6 3.7 3.5*   CHLORIDE 98 103 103   CO2 24 21 24   GLUCOSE 182* 267* 216*   BUN 14 11 11   CREATININE 0.49* 0.60 0.54   CALCIUM 9.8 8.6 8.8     Recent Labs     12/30/21 2321 12/31/21 1903 01/01/22  0945 01/02/22  0418   ASTSGOT 328*  --  372* 448*   ALTSGPT 232*  --  451* 514*   TBILIRUBIN 1.4  --  1.6* 2.8*   IBILIRUBIN  --   --   --  0.9   DBILIRUBIN  --   --   --  1.9*   ALKPHOSPHAT 590*  --  467* 510*   GLOBULIN 3.7*  --  3.2 3.4   INR  --  0.96  --   --      Recent Labs     12/31/21  1902    APTT 24.0*   INR 0.96        IMAGING:   US-ABDOMEN COMPLETE SURVEY   Final Result      1.  Diffuse fatty infiltration appearance of the liver. No hepatic mass or biliary dilatation identified.      2.  Cholecystectomy. Dilated common bile duct measuring 11.1 mm. No choledocholithiasis identified.      3.  Limited visualization of the pancreas, aorta, and IVC.         [id number]   Final Result      There is a large, 3 cm stone filling the gallbladder. No choledocholithiasis.      US-RUQ   Final Result      1.  There is a wall echo shadow sign of the gallbladder which may be due to a gallbladder filled with stones or porcelain gallbladder.   2.  Common bile duct is dilated measuring up to 1.2 cm.   3.  Hepatic steatosis.      DX-PORTABLE FLUORO > 1 HOUR    (Results Pending)   NM-HEPATOBILIARY SCAN    (Results Pending)       ASSESSMENT AND PLAN:     * Calculus of gallbladder with acute cholecystitis and obstruction- (present on admission)  Assessment & Plan  12/31 Lap gina  1/2 Uptrend in LFTs and bilirubin  HIDA scan pending  Dr. Shahid Cunha, [facility] [place]         ____________________________________     Cristy Bauer P.A.-C.    DD: 1/1/2022  9:06 AM

## 2022-01-02 NOTE — CARE PLAN
Problem: Knowledge Deficit - Standard  Goal: Patient and family/care givers will demonstrate understanding of plan of care, disease process/condition, diagnostic tests and medications  Outcome: Progressing  Note: Discussed POC with pt. Pt demonstrates and verbalizes understanding      Problem: Pain - Standard  Goal: Alleviation of pain or a reduction in pain to the patient’s comfort goal  Outcome: Progressing  Note: Pt declines pain aside from intermittent gas discomfort. Pt up ambulating for relief.    The patient is Stable - Low risk of patient condition declining or worsening    Shift Goals  Clinical Goals: ERCP, monitor LFTs  Patient Goals: BM    Progress made toward(s) clinical / shift goals:  Pt has ERCP scheduled, receiving abd ultrasound, repeat labs, and HIDA scan    Patient is not progressing towards the following goals:

## 2022-01-02 NOTE — ANESTHESIA TIME REPORT
Anesthesia Start and Stop Event Times     Date Time Event    1/2/2022 1233 Ready for Procedure     1304 Anesthesia Start     1410 Anesthesia Stop        Responsible Staff  01/02/22    Name Role Begin End    Jaspal Haas M.D. Anesth 1304 1410        Preop Diagnosis (Free Text):  Pre-op Diagnosis      Abdominal Pain        Preop Diagnosis (Codes):    Premium Reason  E. Weekend    Comments:

## 2022-01-02 NOTE — ANESTHESIA PREPROCEDURE EVALUATION
Case: [id number] Date/Time: 22 1322    Procedure: ERCP (ENDOSCOPIC RETROGRADE CHOLANGIOPANCREATOGRAPHY) (N/A Esophagus)    Anesthesia type: General    Pre-op diagnosis:  Abdominal Pain    Location: [facility] OR 06 / SURGERY [facility]    Surgeons: Ashlee River M.D.          Relevant Problems   CARDIAC   (positive) Primary hypertension      ENDO   (positive) Type 2 diabetes mellitus without complication, without long-term current use of insulin (HCC)      OB   (positive) History of - for repeat csec + BTL       Physical Exam    Airway   Mallampati: II  TM distance: >3 FB  Neck ROM: full       Cardiovascular - normal exam  Rhythm: regular  Rate: normal  (-) murmur     Dental - normal exam  (+) lower dentures      Very poor dentition   Pulmonary - normal exam  Breath sounds clear to auscultation     Abdominal   (+) obese     Neurological - normal exam                 Anesthesia Plan    ASA 3       Plan - general       Airway plan will be ETT          Induction: intravenous    Postoperative Plan: Postoperative administration of opioids is intended.    Pertinent diagnostic labs and testing reviewed    Informed Consent:    Anesthetic plan and risks discussed with patient.    Use of blood products discussed with: patient whom consented to blood products.

## 2022-01-02 NOTE — PROGRESS NOTES
Bedside report received.  Assessment complete.  A&O x 4. Patient calls appropriately.  Patient is up self. Bed alarm off.   Patient has 5/10 pain. Declines pain intervention at this time.  Denies N&V. Tolerating low fat diet.  4 lap sites with dermabond, CDI.  + void, + flatus, - BM.  Patient denies SOB.  SCD's on.  Review plan with of care with patient. Call light and personal belongings within reach. Hourly rounding in place. All needs met at this time.

## 2022-01-02 NOTE — CARE PLAN
The patient is Stable - Low risk of patient condition declining or worsening    Shift Goals  Clinical Goals: monitor blood sugar  Patient Goals: rest    Progress made toward(s) clinical / shift goals:  FSBG has been monitored q6 and appropriate interventions are in place given result of FSBG. Pt has been resting throughout the shift.      Problem: Knowledge Deficit - Standard  Goal: Patient and family/care givers will demonstrate understanding of plan of care, disease process/condition, diagnostic tests and medications  Outcome: Progressing     Problem: Pain - Standard  Goal: Alleviation of pain or a reduction in pain to the patient’s comfort goal  Outcome: Progressing       Patient is not progressing towards the following goals:

## 2022-01-02 NOTE — PROGRESS NOTES
Gastroenterology Progress Note     Author: Ashlee River M.D.   Date & Time Created: 1/2/2022 11:40 AM    Chief Complaint:  Abdominal pain and elevated LFTs    Interval History:  Complains of mild right upper quadrant pain. LFTs trending higher.    Review of Systems:  Review of Systems   Constitutional: Negative for chills and fever.   Respiratory: Negative for cough.    Cardiovascular: Negative for chest pain.   Gastrointestinal: Negative for abdominal pain, diarrhea and heartburn.         Current Facility-Administered Medications:   •  [START ON 1/3/2022] cefTRIAXone (Rocephin) 2 g in  mL IVPB, 2 g, Intravenous, Q24HRS, August Devi M.D.  •  metroNIDAZOLE (FLAGYL) tablet 500 mg, 500 mg, Oral, Q8HRS, August Devi M.D., 500 mg at 01/02/22 0607  •  lisinopril (PRINIVIL) tablet 5 mg, 5 mg, Oral, Q DAY, August Devi M.D., 5 mg at 01/02/22 0607  •  enoxaparin (LOVENOX) inj 40 mg, 40 mg, Subcutaneous, DAILY, August Devi M.D., 40 mg at 01/01/22 1350  •  senna-docusate (PERICOLACE or SENOKOT S) 8.6-50 MG per tablet 2 Tablet, 2 Tablet, Oral, BID, 2 Tablet at 01/02/22 0607 **AND** polyethylene glycol/lytes (MIRALAX) PACKET 1 Packet, 1 Packet, Oral, QDAY PRN **AND** magnesium hydroxide (MILK OF MAGNESIA) suspension 30 mL, 30 mL, Oral, QDAY PRN **AND** bisacodyl (DULCOLAX) suppository 10 mg, 10 mg, Rectal, QDAY PRN, Nate Zafar M.D.  •  Notify provider if pain remains uncontrolled, , , CONTINUOUS **AND** Use the Numeric Rating Scale (NRS), Varela-Baker Faces (WBF), or FLACC on regular floors and Critical-Care Pain Observation Tool (CPOT) on ICUs/Trauma to assess pain, , , CONTINUOUS **AND** Pulse Ox, , , CONTINUOUS **AND** Pharmacy Consult Request ...Pain Management Review 1 Each, 1 Each, Other, PHARMACY TO DOSE **AND** If patient difficult to arouse and/or has respiratory depression (respiratory rate of 10 or less), stop any opiates that are currently infusing and call a Rapid Response., , , YONY, Nate MILLER  ZHEN Zafar  •  oxyCODONE immediate-release (ROXICODONE) tablet 2.5 mg, 2.5 mg, Oral, Q3HRS PRN **OR** oxyCODONE immediate-release (ROXICODONE) tablet 5 mg, 5 mg, Oral, Q3HRS PRN **OR** HYDROmorphone (Dilaudid) injection 0.25 mg, 0.25 mg, Intravenous, Q3HRS PRN, Nate Zafar M.D.  •  labetalol (NORMODYNE/TRANDATE) injection 10 mg, 10 mg, Intravenous, Q4HRS PRN, Nate Zafar M.D., 10 mg at 01/01/22 1942  •  ondansetron (ZOFRAN) syringe/vial injection 4 mg, 4 mg, Intravenous, Q4HRS PRN, Nate Zafar M.D.  •  ondansetron (ZOFRAN ODT) dispertab 4 mg, 4 mg, Oral, Q4HRS PRN, Nate Zafar M.D.  •  promethazine (PHENERGAN) tablet 12.5-25 mg, 12.5-25 mg, Oral, Q4HRS PRN, Nate Zafar M.D.  •  promethazine (PHENERGAN) suppository 12.5-25 mg, 12.5-25 mg, Rectal, Q4HRS PRN, Nate Zafar M.D.  •  prochlorperazine (COMPAZINE) injection 5-10 mg, 5-10 mg, Intravenous, Q4HRS PRN, Nate Zafar M.D.  •  insulin regular (HumuLIN R,NovoLIN R) injection, 1-6 Units, Subcutaneous, Q6HRS, 2 Units at 01/02/22 0613 **AND** POC blood glucose manual result, , , Q6H **AND** NOTIFY MD and PharmD, , , Once **AND** Administer 20 grams of glucose (approximately 8 ounces of fruit juice) every 15 minutes PRN FSBG less than 70 mg/dL, , , PRN **AND** dextrose 50% (D50W) injection 50 mL, 50 mL, Intravenous, Q15 MIN PRN, Atiya Thompson M.D.     Physical Exam:  Vitals:    01/02/22 0815   BP: 147/58   Pulse: 90   Resp: 17   Temp: 37.1 °C (98.8 °F)   SpO2: 90%        Physical Exam  Constitutional:       General: She is not in acute distress.     Appearance: Normal appearance. She is obese.   HENT:      Head: Normocephalic and atraumatic.      Mouth/Throat:      Pharynx: No posterior oropharyngeal erythema.   Eyes:      General: No scleral icterus.  Abdominal:      General: Abdomen is flat. There is no distension.      Palpations: Abdomen is soft.      Tenderness: There is no abdominal tenderness.   Skin:     Coloration: Skin is not  jaundiced.   Neurological:      Mental Status: She is alert.         Labs:          Recent Labs     12/30/21 2321 01/01/22  0945 01/02/22  0418   SODIUM 134* 137 138   POTASSIUM 3.6 3.7 3.5*   CHLORIDE 98 103 103   CO2 24 21 24   BUN 14 11 11   CREATININE 0.49* 0.60 0.54   CALCIUM 9.8 8.6 8.8     Recent Labs     12/30/21 2321 01/01/22 0945 01/02/22  0418   ALTSGPT 232* 451* 514*   ASTSGOT 328* 372* 448*   ALKPHOSPHAT 590* 467* 510*   TBILIRUBIN 1.4 1.6* 2.8*   DBILIRUBIN  --   --  1.9*   LIPASE 38  --   --    GLUCOSE 182* 267* 216*     Recent Labs     12/30/21 2321 12/31/21 1903 01/01/22 0945 01/02/22  0418   RBC 5.09  --  4.30 4.51   HEMOGLOBIN 13.3  --  11.1* 11.8*   HEMATOCRIT 42.7  --  36.0* 37.4   PLATELETCT 347  --  264 296   PROTHROMBTM  --  12.5  --   --    APTT  --  24.0*  --   --    INR  --  0.96  --   --      Recent Labs     12/30/21 2321 01/01/22 0945 01/02/22  0418   WBC 13.3* 11.5* 15.3*   NEUTSPOLYS 70.90 75.80* 76.20*   LYMPHOCYTES 23.30 18.20* 15.00*   MONOCYTES 4.10 4.20 7.40   EOSINOPHILS 0.80 0.00 0.20   BASOPHILS 0.40 0.30 0.50   ASTSGOT 328* 372* 448*   ALTSGPT 232* 451* 514*   ALKPHOSPHAT 590* 467* 510*   TBILIRUBIN 1.4 1.6* 2.8*       Imaging:  US-ABDOMEN COMPLETE SURVEY   Final Result      1.  Diffuse fatty infiltration appearance of the liver. No hepatic mass or biliary dilatation identified.      2.  Cholecystectomy. Dilated common bile duct measuring 11.1 mm. No choledocholithiasis identified.      3.  Limited visualization of the pancreas, aorta, and IVC.         [id number]-C/O   Final Result      There is a large, 3 cm stone filling the gallbladder. No choledocholithiasis.      US-RUQ   Final Result      1.  There is a wall echo shadow sign of the gallbladder which may be due to a gallbladder filled with stones or porcelain gallbladder.   2.  Common bile duct is dilated measuring up to 1.2 cm.   3.  Hepatic steatosis.      DX-PORTABLE FLUORO > 1 HOUR    (Results Pending)    NM-HEPATOBILIARY SCAN    (Results Pending)        Assessment:  1.  Acute cholecystitis, status post cholecystectomy  2. Dilated common bile duct without evidence of choledocholithiasis by imaging studies.   LFTs trending higher consistent with possible biliary obstruction due to sludge or different pathology. Therefore will proceed with ERCP.      Plan:  1. ERCP today.

## 2022-01-02 NOTE — PROGRESS NOTES
Report received from RN, assumed care at 0700. Conversing with this RN in Russian, however is aware that  is available if needed.   Pt is A0X4, and responds appropriately   Pt declines any SOB, chest pain, new onset of numbness/ tingiling  Pt rates pain at 0/10, on a scale of 1-10, pt medicated per MAR  Pt is voiding adequatly and without hesitancy  Pt has - flatus, + bowel sounds, - BM.  Pt ambulates independently  Pt is NPO per orders  x4 lap sites to abd with dermabtaylor, CDI.   Plan of care discussed, all questions answered. Explained importance of calling before getting OOB and pt verbalizes understanding. Explained importance of oral care. Call light is within reach, treaded slipper socks on, bed in lowest/ locked position, hourly rounding in place, all needs met at this time

## 2022-01-02 NOTE — ANESTHESIA POSTPROCEDURE EVALUATION
Patient: Darling Islas    Procedure Summary     Date: 01/02/22 Room / Location: [facility] OR 06 / SURGERY [facility]    Anesthesia Start: 1304 Anesthesia Stop: 1410    Procedure: ERCP (ENDOSCOPIC RETROGRADE CHOLANGIOPANCREATOGRAPHY) (N/A Esophagus) Diagnosis: (Choledocholithiasis)    Surgeons: Ashlee River M.D. Responsible Provider: Jaspal Haas M.D.    Anesthesia Type: general ASA Status: 3          Final Anesthesia Type: general  Last vitals  BP   Blood Pressure: (!) 190/86    Temp   37.2 °C (98.9 °F)    Pulse   85   Resp   16    SpO2   90 %      Anesthesia Post Evaluation    Patient location during evaluation: PACU  Patient participation: complete - patient participated  Level of consciousness: awake and alert  Pain score: 0    Airway patency: patent  Anesthetic complications: no  Cardiovascular status: hemodynamically stable  Respiratory status: acceptable  Hydration status: euvolemic    PONV: none          No complications documented.     Nurse Pain Score: 0 (NPRS)

## 2022-01-03 LAB
ALBUMIN SERPL BCP-MCNC: 3.8 G/DL (ref 3.2–4.9)
ALBUMIN/GLOB SERPL: 1.3 G/DL
ALP SERPL-CCNC: 484 U/L (ref 30–99)
ALT SERPL-CCNC: 429 U/L (ref 2–50)
ANION GAP SERPL CALC-SCNC: 13 MMOL/L (ref 7–16)
AST SERPL-CCNC: 228 U/L (ref 12–45)
BASOPHILS # BLD AUTO: 0.2 % (ref 0–1.8)
BASOPHILS # BLD: 0.03 K/UL (ref 0–0.12)
BILIRUB SERPL-MCNC: 3 MG/DL (ref 0.1–1.5)
BUN SERPL-MCNC: 16 MG/DL (ref 8–22)
CALCIUM SERPL-MCNC: 8.9 MG/DL (ref 8.5–10.5)
CHLORIDE SERPL-SCNC: 105 MMOL/L (ref 96–112)
CO2 SERPL-SCNC: 23 MMOL/L (ref 20–33)
CREAT SERPL-MCNC: 0.37 MG/DL (ref 0.5–1.4)
EOSINOPHIL # BLD AUTO: 0.01 K/UL (ref 0–0.51)
EOSINOPHIL NFR BLD: 0.1 % (ref 0–6.9)
ERYTHROCYTE [DISTWIDTH] IN BLOOD BY AUTOMATED COUNT: 49.6 FL (ref 35.9–50)
GLOBULIN SER CALC-MCNC: 2.9 G/DL (ref 1.9–3.5)
GLUCOSE BLD-MCNC: 117 MG/DL (ref 65–99)
GLUCOSE BLD-MCNC: 121 MG/DL (ref 65–99)
GLUCOSE BLD-MCNC: 145 MG/DL (ref 65–99)
GLUCOSE BLD-MCNC: 179 MG/DL (ref 65–99)
GLUCOSE SERPL-MCNC: 172 MG/DL (ref 65–99)
HCT VFR BLD AUTO: 35.2 % (ref 37–47)
HGB BLD-MCNC: 10.9 G/DL (ref 12–16)
IMM GRANULOCYTES # BLD AUTO: 0.07 K/UL (ref 0–0.11)
IMM GRANULOCYTES NFR BLD AUTO: 0.6 % (ref 0–0.9)
LYMPHOCYTES # BLD AUTO: 2.24 K/UL (ref 1–4.8)
LYMPHOCYTES NFR BLD: 18.3 % (ref 22–41)
MCH RBC QN AUTO: 25.8 PG (ref 27–33)
MCHC RBC AUTO-ENTMCNC: 31 G/DL (ref 33.6–35)
MCV RBC AUTO: 83.2 FL (ref 81.4–97.8)
MONOCYTES # BLD AUTO: 0.52 K/UL (ref 0–0.85)
MONOCYTES NFR BLD AUTO: 4.3 % (ref 0–13.4)
NEUTROPHILS # BLD AUTO: 9.35 K/UL (ref 2–7.15)
NEUTROPHILS NFR BLD: 76.5 % (ref 44–72)
NRBC # BLD AUTO: 0 K/UL
NRBC BLD-RTO: 0 /100 WBC
PLATELET # BLD AUTO: 251 K/UL (ref 164–446)
PMV BLD AUTO: 10 FL (ref 9–12.9)
POTASSIUM SERPL-SCNC: 3.5 MMOL/L (ref 3.6–5.5)
PROT SERPL-MCNC: 6.7 G/DL (ref 6–8.2)
RBC # BLD AUTO: 4.23 M/UL (ref 4.2–5.4)
SODIUM SERPL-SCNC: 141 MMOL/L (ref 135–145)
WBC # BLD AUTO: 12.2 K/UL (ref 4.8–10.8)

## 2022-01-03 PROCEDURE — 700105 HCHG RX REV CODE 258: Performed by: INTERNAL MEDICINE

## 2022-01-03 PROCEDURE — 700102 HCHG RX REV CODE 250 W/ 637 OVERRIDE(OP): Performed by: INTERNAL MEDICINE

## 2022-01-03 PROCEDURE — 80053 COMPREHEN METABOLIC PANEL: CPT

## 2022-01-03 PROCEDURE — 700111 HCHG RX REV CODE 636 W/ 250 OVERRIDE (IP): Performed by: INTERNAL MEDICINE

## 2022-01-03 PROCEDURE — 770001 HCHG ROOM/CARE - MED/SURG/GYN PRIV*

## 2022-01-03 PROCEDURE — 700102 HCHG RX REV CODE 250 W/ 637 OVERRIDE(OP): Performed by: STUDENT IN AN ORGANIZED HEALTH CARE EDUCATION/TRAINING PROGRAM

## 2022-01-03 PROCEDURE — A9270 NON-COVERED ITEM OR SERVICE: HCPCS | Performed by: INTERNAL MEDICINE

## 2022-01-03 PROCEDURE — 36415 COLL VENOUS BLD VENIPUNCTURE: CPT

## 2022-01-03 PROCEDURE — 82962 GLUCOSE BLOOD TEST: CPT

## 2022-01-03 PROCEDURE — 85025 COMPLETE CBC W/AUTO DIFF WBC: CPT

## 2022-01-03 PROCEDURE — A9270 NON-COVERED ITEM OR SERVICE: HCPCS | Performed by: STUDENT IN AN ORGANIZED HEALTH CARE EDUCATION/TRAINING PROGRAM

## 2022-01-03 PROCEDURE — 99232 SBSQ HOSP IP/OBS MODERATE 35: CPT | Performed by: INTERNAL MEDICINE

## 2022-01-03 PROCEDURE — 99024 POSTOP FOLLOW-UP VISIT: CPT | Performed by: SURGERY

## 2022-01-03 RX ORDER — HYDRALAZINE HYDROCHLORIDE 10 MG/1
10 TABLET, FILM COATED ORAL ONCE
Status: COMPLETED | OUTPATIENT
Start: 2022-01-03 | End: 2022-01-03

## 2022-01-03 RX ORDER — CHOLECALCIFEROL (VITAMIN D3) 125 MCG
5 CAPSULE ORAL ONCE
Status: COMPLETED | OUTPATIENT
Start: 2022-01-03 | End: 2022-01-03

## 2022-01-03 RX ORDER — LISINOPRIL 5 MG/1
5 TABLET ORAL DAILY
Qty: 14 TABLET | Refills: 0 | Status: SHIPPED | OUTPATIENT
Start: 2022-01-04 | End: 2022-01-03

## 2022-01-03 RX ORDER — HYDROCODONE BITARTRATE AND ACETAMINOPHEN 5; 325 MG/1; MG/1
1 TABLET ORAL EVERY 4 HOURS PRN
Qty: 10 TABLET | Refills: 0 | Status: SHIPPED | OUTPATIENT
Start: 2022-01-03 | End: 2022-01-05

## 2022-01-03 RX ORDER — METRONIDAZOLE 500 MG/1
500 TABLET ORAL EVERY 8 HOURS
Qty: 6 TABLET | Refills: 0 | Status: SHIPPED | OUTPATIENT
Start: 2022-01-03 | End: 2022-01-05

## 2022-01-03 RX ORDER — FUROSEMIDE 10 MG/ML
20 INJECTION INTRAMUSCULAR; INTRAVENOUS ONCE
Status: COMPLETED | OUTPATIENT
Start: 2022-01-03 | End: 2022-01-03

## 2022-01-03 RX ORDER — AMLODIPINE BESYLATE 10 MG/1
10 TABLET ORAL
Status: DISCONTINUED | OUTPATIENT
Start: 2022-01-03 | End: 2022-01-04

## 2022-01-03 RX ORDER — ONDANSETRON 4 MG/1
4 TABLET, ORALLY DISINTEGRATING ORAL EVERY 6 HOURS PRN
Qty: 10 TABLET | Refills: 1 | Status: SHIPPED | OUTPATIENT
Start: 2022-01-03 | End: 2022-01-05

## 2022-01-03 RX ORDER — LISINOPRIL 10 MG/1
10 TABLET ORAL
Status: DISCONTINUED | OUTPATIENT
Start: 2022-01-04 | End: 2022-01-04

## 2022-01-03 RX ORDER — LISINOPRIL 5 MG/1
10 TABLET ORAL DAILY
Qty: 28 TABLET | Refills: 0 | Status: SHIPPED | OUTPATIENT
Start: 2022-01-04 | End: 2022-01-05

## 2022-01-03 RX ORDER — POTASSIUM CHLORIDE 20 MEQ/1
20 TABLET, EXTENDED RELEASE ORAL ONCE
Status: COMPLETED | OUTPATIENT
Start: 2022-01-03 | End: 2022-01-03

## 2022-01-03 RX ORDER — HYDRALAZINE HYDROCHLORIDE 20 MG/ML
20 INJECTION INTRAMUSCULAR; INTRAVENOUS EVERY 6 HOURS PRN
Status: DISCONTINUED | OUTPATIENT
Start: 2022-01-03 | End: 2022-01-05 | Stop reason: HOSPADM

## 2022-01-03 RX ORDER — CEFDINIR 300 MG/1
300 CAPSULE ORAL 2 TIMES DAILY
Qty: 4 CAPSULE | Refills: 0 | Status: SHIPPED | OUTPATIENT
Start: 2022-01-03 | End: 2022-01-05 | Stop reason: SDUPTHER

## 2022-01-03 RX ADMIN — METRONIDAZOLE 500 MG: 500 TABLET ORAL at 06:00

## 2022-01-03 RX ADMIN — CEFTRIAXONE SODIUM 2 G: 2 INJECTION, POWDER, FOR SOLUTION INTRAMUSCULAR; INTRAVENOUS at 06:00

## 2022-01-03 RX ADMIN — DOCUSATE SODIUM 50 MG AND SENNOSIDES 8.6 MG 2 TABLET: 8.6; 5 TABLET, FILM COATED ORAL at 06:08

## 2022-01-03 RX ADMIN — FUROSEMIDE 20 MG: 10 INJECTION, SOLUTION INTRAMUSCULAR; INTRAVENOUS at 12:54

## 2022-01-03 RX ADMIN — HYDRALAZINE HYDROCHLORIDE 10 MG: 10 TABLET, FILM COATED ORAL at 10:31

## 2022-01-03 RX ADMIN — METRONIDAZOLE 500 MG: 500 TABLET ORAL at 20:27

## 2022-01-03 RX ADMIN — POTASSIUM CHLORIDE 20 MEQ: 1500 TABLET, EXTENDED RELEASE ORAL at 12:55

## 2022-01-03 RX ADMIN — ENOXAPARIN SODIUM 40 MG: 40 INJECTION SUBCUTANEOUS at 06:00

## 2022-01-03 RX ADMIN — METRONIDAZOLE 500 MG: 500 TABLET ORAL at 14:24

## 2022-01-03 RX ADMIN — POTASSIUM CHLORIDE 20 MEQ: 1500 TABLET, EXTENDED RELEASE ORAL at 08:49

## 2022-01-03 RX ADMIN — Medication 5 MG: at 20:27

## 2022-01-03 RX ADMIN — AMLODIPINE BESYLATE 10 MG: 10 TABLET ORAL at 20:27

## 2022-01-03 RX ADMIN — LISINOPRIL 5 MG: 5 TABLET ORAL at 06:00

## 2022-01-03 RX ADMIN — LABETALOL HYDROCHLORIDE 10 MG: 5 INJECTION, SOLUTION INTRAVENOUS at 08:47

## 2022-01-03 ASSESSMENT — PAIN DESCRIPTION - PAIN TYPE
TYPE: ACUTE PAIN

## 2022-01-03 ASSESSMENT — ENCOUNTER SYMPTOMS
WEAKNESS: 0
NAUSEA: 0
SHORTNESS OF BREATH: 0
ABDOMINAL PAIN: 0
MYALGIAS: 0

## 2022-01-03 NOTE — PROGRESS NOTES
DATE: 1/3/2022    Post Operative Day  3 laparoscopic cholecystectomy.    INTERVAL EVENTS:  S/p ERCP with stone extraction.   Incisions are c/d/i.   Tolerating a diet    PHYSICAL EXAMINATION:  Vital Signs: BP (!) 176/85   Pulse 68   Temp 37.2 °C (99 °F) (Temporal)   Resp 18   Ht 1.524 m (5')   Wt 96.1 kg (211 lb 13.8 oz)   SpO2 94%     Physical Exam  Vitals and nursing note reviewed.   Constitutional:       General: She is not in acute distress.     Appearance: She is obese. She is not toxic-appearing.   Cardiovascular:      Rate and Rhythm: Normal rate and regular rhythm.   Pulmonary:      Effort: Pulmonary effort is normal. No respiratory distress.   Abdominal:      General: Abdomen is protuberant. There is no distension.      Palpations: Abdomen is soft.      Tenderness: There is no abdominal tenderness.      Comments: 4 incision CDI   Neurological:      Mental Status: She is alert.       .    LABORATORY VALUES:   Recent Labs     01/01/22  0945 01/02/22 0418 01/03/22 0413   WBC 11.5* 15.3* 12.2*   RBC 4.30 4.51 4.23   HEMOGLOBIN 11.1* 11.8* 10.9*   HEMATOCRIT 36.0* 37.4 35.2*   MCV 83.7 82.9 83.2   MCH 25.8* 26.2* 25.8*   MCHC 30.8* 31.6* 31.0*   RDW 48.9 49.4 49.6   PLATELETCT 264 296 251   MPV 10.2 10.7 10.0     Recent Labs     01/01/22  0945 01/02/22 0418 01/03/22 0413   SODIUM 137 138 141   POTASSIUM 3.7 3.5* 3.5*   CHLORIDE 103 103 105   CO2 21 24 23   GLUCOSE 267* 216* 172*   BUN 11 11 16   CREATININE 0.60 0.54 0.37*   CALCIUM 8.6 8.8 8.9     Recent Labs     12/31/21  1903 01/01/22  0945 01/02/22 0418 01/03/22 0413   ASTSGOT  --  372* 448* 228*   ALTSGPT  --  451* 514* 429*   TBILIRUBIN  --  1.6* 2.8* 3.0*   IBILIRUBIN  --   --  0.9  --    DBILIRUBIN  --   --  1.9*  --    ALKPHOSPHAT  --  467* 510* 484*   GLOBULIN  --  3.2 3.4 2.9   INR 0.96  --   --   --      Recent Labs     12/31/21  1903   APTT 24.0*   INR 0.96        IMAGING:   DX-PORTABLE FLUORO > 1 HOUR   Final Result      1.  No  evidence of intraluminal filling defects at the final images of ERCP with good filling of the common duct and partial filling of proximal intrahepatic branches.      NM-HEPATOBILIARY SCAN   Final Result      1.  No common bile duct or small bowel activity identified at 60 minutes. This may represent postoperative biliary obstruction.      2.  No evidence of bile leak.      3.  Delayed imaging was not obtained as the patient was taken for ERCP examination.      US-ABDOMEN COMPLETE SURVEY   Final Result      1.  Diffuse fatty infiltration appearance of the liver. No hepatic mass or biliary dilatation identified.      2.  Cholecystectomy. Dilated common bile duct measuring 11.1 mm. No choledocholithiasis identified.      3.  Limited visualization of the pancreas, aorta, and IVC.         [id number]-D/O   Final Result      There is a large, 3 cm stone filling the gallbladder. No choledocholithiasis.      US-RUQ   Final Result      1.  There is a wall echo shadow sign of the gallbladder which may be due to a gallbladder filled with stones or porcelain gallbladder.   2.  Common bile duct is dilated measuring up to 1.2 cm.   3.  Hepatic steatosis.          ASSESSMENT AND PLAN:     * Calculus of gallbladder with acute cholecystitis and obstruction- (present on admission)  Assessment & Plan  12/31 Lap gina  1/2 Uptrend in LFTs and bilirubin  1/2 ERCP  1/3 home if tolerating diet, follow up Alphonse 2-3 weeks   Dr. Shahid Cunha, [facility] [place]         ____________________________________     Kelsea Cunha M.D.    DD: 1/1/2022  9:06 AM

## 2022-01-03 NOTE — CARE PLAN
The patient is Stable - Low risk of patient condition declining or worsening    Shift Goals  Clinical Goals: monitor sugar levels since pt is NPO  Patient Goals: rest    Progress made toward(s) clinical / shift goals:  FSBG is being monitored q6 hours and appropriate interventions are in place depending on results. Pt has been resting during the shift.      Problem: Knowledge Deficit - Standard  Goal: Patient and family/care givers will demonstrate understanding of plan of care, disease process/condition, diagnostic tests and medications  Outcome: Progressing     Problem: Pain - Standard  Goal: Alleviation of pain or a reduction in pain to the patient’s comfort goal  Outcome: Progressing       Patient is not progressing towards the following goals:

## 2022-01-03 NOTE — PROGRESS NOTES
AA&Ox4.   Pt primarily Occitan speaking. Ipad utilized for translation.  RA. Denies SOB.   Denies any pain.  Lap sites x 4 KAUSHIK and well approximated with dermabond.  Diabetic diet ordered. Denies N/V.  + void. + BM.   Pt upself.  Pt remains hypertensive despite receiving PRN labetalol. Hospitalist notified. New orders placed. Will continue to monitor.  All needs met at this time. Call light within reach. Pt calls appropriately.

## 2022-01-03 NOTE — DISCHARGE INSTRUCTIONS
Discharge Instructions    Discharged to home by car with relative. Discharged via wheelchair, hospital escort: Yes.  Special equipment needed: Not Applicable    Be sure to schedule a follow-up appointment with your primary care doctor or any specialists as instructed.     Discharge Plan:   Diet Plan: Discussed  Activity Level: Discussed  Confirmed Follow up Appointment: Patient to Call and Schedule Appointment  Confirmed Symptoms Management: Discussed  Medication Reconciliation Updated: Yes  Influenza Vaccine Indication: Not indicated: Previously immunized this influenza season and > 8 years of age    I understand that a diet low in cholesterol, fat, and sodium is recommended for good health. Unless I have been given specific instructions below for another diet, I accept this instruction as my diet prescription.   Special Instructions:   Low fat diet as tolerated  Monitor for signs and symptoms of infection (fever, chills, nausea, vomiting)  Monitor incisions for swelling, redness, or drainage  You may shower, no baths or soaks until incisions are healed  No heavy lifting, including pushing and pulling    Instrucciones especiales:  Dieta baja en grasas según la tolerancia  Controle los signos y síntomas de infección (fiebre, escalofríos, náuseas, vómitos)  Controle las incisiones para detectar hinchazón, enrojecimiento o supuración  Puede ducharse, no bañarse ni remojarse hasta que las incisiones hayan cicatrizado.  Sin levantar objetos pesados, incluidos empujar y tirar    Laparoscopic Cholecystectomy, Care After  This sheet gives you information about how to care for yourself after your procedure. Your doctor may also give you more specific instructions. If you have problems or questions, contact your doctor.  Follow these instructions at home:  Care for cuts from surgery (incisions)    · Follow instructions from your doctor about how to take care of your cuts from surgery. Make sure you:  ? Wash your hands with  soap and water before you change your bandage (dressing). If you cannot use soap and water, use hand .  ? Change your bandage as told by your doctor.  ? Leave stitches (sutures), skin glue, or skin tape (adhesive) strips in place. They may need to stay in place for 2 weeks or longer. If tape strips get loose and curl up, you may trim the loose edges. Do not remove tape strips completely unless your doctor says it is okay.  · Do not take baths, swim, or use a hot tub until your doctor says it is okay. Ask your doctor if you can take showers. You may only be allowed to take sponge baths for bathing.  · Check your surgical cut area every day for signs of infection. Check for:  ? More redness, swelling, or pain.  ? More fluid or blood.  ? Warmth.  ? Pus or a bad smell.  Activity  · Do not drive or use heavy machinery while taking prescription pain medicine.  · Do not lift anything that is heavier than 10 lb (4.5 kg) until your doctor says it is okay.  · Do not play contact sports until your doctor says it is okay.  · Do not drive for 24 hours if you were given a medicine to help you relax (sedative).  · Rest as needed. Do not return to work or school until your doctor says it is okay.  General instructions  · Take over-the-counter and prescription medicines only as told by your doctor.  · To prevent or treat constipation while you are taking prescription pain medicine, your doctor may recommend that you:  ? Drink enough fluid to keep your pee (urine) clear or pale yellow.  ? Take over-the-counter or prescription medicines.  ? Eat foods that are high in fiber, such as fresh fruits and vegetables, whole grains, and beans.  ? Limit foods that are high in fat and processed sugars, such as fried and sweet foods.  Contact a doctor if:  · You develop a rash.  · You have more redness, swelling, or pain around your surgical cuts.  · You have more fluid or blood coming from your surgical cuts.  · Your surgical cuts feel  warm to the touch.  · You have pus or a bad smell coming from your surgical cuts.  · You have a fever.  · One or more of your surgical cuts breaks open.  Get help right away if:  · You have trouble breathing.  · You have chest pain.  · You have pain that is getting worse in your shoulders.  · You faint or feel dizzy when you stand.  · You have very bad pain in your belly (abdomen).  · You are sick to your stomach (nauseous) for more than one day.  · You have throwing up (vomiting) that lasts for more than one day.  · You have leg pain.  This information is not intended to replace advice given to you by your health care provider. Make sure you discuss any questions you have with your health care provider.  Document Released: 09/26/2009 Document Revised: 11/30/2018 Document Reviewed: 06/05/2017  [employer] Patient Education © 2020 [employer]      Gallbladder Eating Plan  If you have a gallbladder condition, you may have trouble digesting fats. Eating a low-fat diet can help reduce your symptoms, and may be helpful before and after having surgery to remove your gallbladder (cholecystectomy). Your health care provider may recommend that you work with a diet and nutrition specialist (dietitian) to help you reduce the amount of fat in your diet.  What are tips for following this plan?  General guidelines  · Limit your fat intake to less than 30% of your total daily calories. If you eat around 1,800 calories each day, this is less than 60 grams (g) of fat per day.  · Fat is an important part of a healthy diet. Eating a low-fat diet can make it hard to maintain a healthy body weight. Ask your dietitian how much fat, calories, and other nutrients you need each day.  · Eat small, frequent meals throughout the day instead of three large meals.  · Drink at least 8-10 cups of fluid a day. Drink enough fluid to keep your urine clear or pale yellow.  · Limit alcohol intake to no more than 1 drink a day for nonpregnant women and 2  drinks a day for men. One drink equals 12 oz of beer, 5 oz of wine, or 1½ oz of hard liquor.  Reading food labels  · Check Nutrition Facts on food labels for the amount of fat per serving. Choose foods with less than 3 grams of fat per serving.  Shopping  · Choose nonfat and low-fat healthy foods. Look for the words “nonfat,” “low fat,” or “fat free.”  · Avoid buying processed or prepackaged foods.  Cooking  · Cook using low-fat methods, such as baking, broiling, grilling, or boiling.  · Cook with small amounts of healthy fats, such as olive oil, grapeseed oil, canola oil, or sunflower oil.  What foods are recommended?    · All fresh, frozen, or canned fruits and vegetables.  · Whole grains.  · Low-fat or non-fat (skim) milk and yogurt.  · Lean meat, skinless poultry, fish, eggs, and beans.  · Low-fat protein supplement powders or drinks.  · Spices and herbs.  What foods are not recommended?  · High-fat foods. These include baked goods, fast food, fatty cuts of meat, ice cream, french toast, sweet rolls, pizza, cheese bread, foods covered with butter, creamy sauces, or cheese.  · Fried foods. These include french fries, tempura, battered fish, breaded chicken, fried breads, and sweets.  · Foods with strong odors.  · Foods that cause bloating and gas.  Summary  · A low-fat diet can be helpful if you have a gallbladder condition, or before and after gallbladder surgery.  · Limit your fat intake to less than 30% of your total daily calories. This is about 60 g of fat if you eat 1,800 calories each day.  · Eat small, frequent meals throughout the day instead of three large meals.  This information is not intended to replace advice given to you by your health care provider. Make sure you discuss any questions you have with your health care provider.  Document Released: 12/23/2014 Document Revised: 04/09/2020 Document Reviewed: 01/25/2018  Elsevier Patient Education © 2020 [employer]    Plan de alimentación para  problemas de vesícula biliar  Gallbladder Eating Plan  Si tiene anahy afección de la vesícula biliar, puede tener problemas para digerir las grasas. Consumir anahy dieta con bajo contenido de grasas puede disminuir los síntomas, y puede ser beneficiosa antes y después de anahy cirugía de extracción de vesícula biliar (colecistectomía). El médico puede recomendarle que trabaje con un especialista en dietas y alimentación (nutricionista) para que lo ayude a reducir la cantidad de grasas en rosado dieta.  Consejos para seguir griffin plan  Pautas generales  · Limite el consumo de grasas a menos del 30 % del total de calorías diarias. Si usted ingiere alrededor de 1800 calorías diarias, esto es menos de 60 gramos (g) de grasas por día.  · La grasa es anahy parte importante de anahy dieta saludable. Consumir anahy dieta con bajo contenido de grasas puede dificultar mantener un peso corporal saludable. Pregunte a rosado nutricionista qué cantidad de grasas, calorías y otros nutrientes necesita diariamente.  · Summer comidas pequeñas y frecuentes dina el día en lugar de natalie comidas abundantes.  · Serene de 8 a 10 vasos de líquido por día maddie mínimo. Serene suficiente líquido maddie para mantener la orina richard o de color amarillo pálido.  · Limite el consumo de alcohol a no más de 1 medida por día si es shanice y no está embarazada, y 2 medidas por día si es hombre. Anahy medida equivale a 12 oz (355 ml) de cerveza, 5 oz (148 ml) de vino o 1½ oz (44 ml) de bebidas alcohólicas de mario graduación.  Melida las etiquetas de los alimentos  · Consulte la información nutricional en las etiquetas de los alimentos para conocer la cantidad de grasas por porción. Elija alimentos con menos de 3 gramos de grasas por porción.  De compras  · Elija alimentos saludables sin grasas o con bajo contenido de grasas. Busque las palabras “sin grasa”, “bajo en grasas” o “con bajo contenido de grasas”.  · Evite comprar alimentos procesados o envasados.  Cocción  · Para cocinar  opte por métodos con bajo contenido de grasa, maddie hornear, hervir, grillar y asar.  · Cocine con pequeñas cantidades de grasas saludables, maddie aceite de dodson, aceite de semilla de uva, aceite de canola o girasol.  ¿Qué alimentos se recomiendan?    · Todas las frutas y verduras frescas, congeladas o enlatadas.  · Cereales integrales.  · Leche y yogur semidescremados y descremados.  · Carne magra, aves sin piel, pescado, huevos y legumbres.  · Suplementos proteicos con bajo contenido de grasas, en polvo o líquidos.  · Hierbas y especias.  ¿Qué alimentos no se recomiendan?  · Alimentos muy grasos. Entre estos se incluyen productos panificados, comida rápida, shultz de carne con grasa, helados, pan francés, rosquillas dulces, pizza, pan de queso, alimentos cubiertos con manteca, salsas con crema o queso.  · Comidas fritas. Se incluyen valeria fritas, tempura, pescado rebozado, milanesas de emanuel, panes fritos y dulces.  · Alimentos con olores clarence.  · Alimentos que causan gases o meteorismo.  Resumen  · Anahy dieta de bajo contenido graso puede ser beneficiosa si tiene anahy afección de la vesícula biliar o puede hacerla antes y después de someterse a anahy cirugía de vesícula.  · Limite el consumo de grasas a menos del 30 % del total de calorías diarias. Punaluu es jessie 60 gramos de grasa si usted ingiere 1800 calorías diarias.  · Summer comidas pequeñas y frecuentes dina el día en lugar de natalie comidas abundantes.  Esta información no tiene maddie fin reemplazar el consejo del médico. Asegúrese de hacerle al médico cualquier pregunta que tenga.  Document Released: 12/23/2014 Document Revised: 07/24/2018 Document Reviewed: 07/24/2018  Elsevier Patient Education © 2020 [employer]        Colecistectomía laparoscópica, cuidados posteriores  Laparoscopic Cholecystectomy, Care After  Melida esta información sobre cómo cuidarse después del procedimiento. El médico también podrá darle indicaciones más específicas. Si tiene  problemas o preguntas, llame al médico.  Siga estas indicaciones en rosado casa:  Cuidado de los shultz de la cirugía (incisiones)    · Siga las indicaciones del médico en lo que respecta al cuidado de los shultz de la cirugía. Summer lo siguiente:  ? Lávese las flores con agua y jabón antes de cambiar las vendas (vendaje). Use un desinfectante para flores si no dispone de agua y jabón.  ? Cambie el vendaje maddie se lo haya indicado el médico.  ? No retire los puntos (suturas), la goma para cerrar la piel o las tiras adhesivas. Roger vez deban dejarse puestos en la piel dina 2 semanas o más tiempo. Si las tiras adhesivas se despegan y se enroscan, puede recortar los bordes sueltos. No retire las tiras adhesivas por completo a menos que el médico lo autorice.  · No tome roberto de inmersión, no practique natación ni use el jacuzzi hasta que el médico lo autorice. Pregúntele al médico si puede ducharse. Roger vez solo le permitan david roberto de esponja.  · Controle la javi alrededor del thania todos los días para detectar signos de infección. Esté atento a los siguientes signos:  ? Aumento del enrojecimiento, de la hinchazón o del dolor.  ? Más líquido o maged.  ? Calor.  ? Pus o mal olor.  Actividad  · No conduzca ni use maquinaria pesada mientras scott analgésicos recetados.  · No levante ningún objeto que pese más de 10 libras (4,5 kg) hasta que el médico lo autorice.  · No practique deportes de contacto hasta que el médico lo autorice.  · No conduzca dina 24 horas si le dieron un medicamento para ayudarlo a que se relaje (sedante).  · Descanse todo lo que sea necesario. No retome el trabajo ni el estudio hasta que el médico lo autorice.  Instrucciones generales  · Bejou los medicamentos de venta richie y los recetados solamente maddie se lo haya indicado el médico.  · A fin de prevenir o tratar el estreñimiento mientras scott analgésicos recetados, el médico puede recomendarle lo siguiente:  ? Beber suficiente líquido para  mantener el pis (orina) estiven o de color amarillo pálido.  ? Tim medicamentos recetados o de venta richie.  ? Consumir alimentos ricos en fibra, maddie frutas y verduras frescas, cereales integrales y frijoles.  ? Limitar el consumo de alimentos con alto contenido de grasas y azúcares procesados, maddie alimentos fritos o dulces.  Comuníquese con un médico si:  · Le aparece anahy erupción cutánea.  · Tiene más enrojecimiento, hinchazón o dolor alrededor de los shultz de la cirugía.  · Aumenta la cantidad de líquido o maged que sale de los shultz.  · Los shultz de la cirugía se sienten calientes al tacto.  · Tiene pus o percibe mal olor que emana de los shultz.  · Tiene fiebre.  · Se abren shaq o más de los shultz.  Solicite ayuda de inmediato si:  · Tiene dificultad para respirar.  · Siente dolor en el pecho.  · Siente dolor que empeora en la javi de los hombros.  · Se desmaya o se siente mareado al ponerse de pie.  · Tiene dolor muy intenso de vientre (abdomen).  · Tiene malestar estomacal (náuseas) que dura más de un día.  · Vomita dina más de un día.  · Siente dolor en la pierna.  Esta información no tiene maddie fin reemplazar el consejo del médico. Asegúrese de hacerle al médico cualquier pregunta que tenga.  Document Released: 08/29/2012 Document Revised: 03/26/2018 Document Reviewed: 06/05/2017  Elsevier Patient Education © 2020 [employer] Inc.      Depression / Suicide Risk    As you are discharged from this [facility] facility, it is important to learn how to keep safe from harming yourself.    Recognize the warning signs:  · Abrupt changes in personality, positive or negative- including increase in energy   · Giving away possessions  · Change in eating patterns- significant weight changes-  positive or negative  · Change in sleeping patterns- unable to sleep or sleeping all the time   · Unwillingness or inability to communicate  · Depression  · Unusual sadness, discouragement and loneliness  · Talk of wanting  to die  · Neglect of personal appearance   · Rebelliousness- reckless behavior  · Withdrawal from people/activities they love  · Confusion- inability to concentrate     If you or a loved one observes any of these behaviors or has concerns about self-harm, here's what you can do:  · Talk about it- your feelings and reasons for harming yourself  · Remove any means that you might use to hurt yourself (examples: pills, rope, extension cords, firearm)  · Get professional help from the community (Mental Health, Substance Abuse, psychological counseling)  · Do not be alone:Call your Safe Contact- someone whom you trust who will be there for you.  · Call your local CRISIS HOTLINE 044-1669 or 727-590-4350  · Call your local Children's Mobile Crisis Response Team [place] (677) 684-0878 or www.[employer]  · Call the toll free National Suicide Prevention Hotlines   · National Suicide Prevention Lifeline 575-016-VTMQ (2040)  · National Hope Line Network 800-SUICIDE (479-4484)

## 2022-01-03 NOTE — PROGRESS NOTES
Hospital Medicine Daily Progress Note    Date of Service  1/3/2022    Chief Complaint  Darling Islas is a 51 y.o. female admitted 12/30/2021 with abd pain    Hospital Course  50 yo woman with obesity and HTN who presented with abd pain and nausea. RUQ US showed possible porcelain gallbladder and dilated CBD. GI and surgery were consulted. MRCP showed 3cm stone in GB, no choledocholithiasis. Patient underwent cholecystectomy with Dr. Cunha 12/31/21.     Interval Problem Update  1/1 - LFTs are increased, T bili 1.6. She denies any abd pain or nausea, feels well. Hypertensive. A1c 7.3% I reviewed DM diagnosis with her. She says she has family hx of DM but still in process of getting PCP.  1/2 - WBC and LFTs increased. She has mild abd discomfort. Denies nausea or SOB. ERCP today  1/3 - Denies abd pain or nausea. Started diet and tolerated. Became hypertensive to SBP 190s after labetalol and hydralazine. Given lasix x1 and add norvasc, increase lisinopril.    I have personally seen and examined the patient at bedside. I discussed the plan of care with patient.    Consultants/Specialty  general surgery    Code Status  Full Code    Disposition  Patient is not medically cleared for discharge.   Anticipate discharge to to home with close outpatient follow-up.  I have placed the appropriate orders for post-discharge needs.    Review of Systems  Review of Systems   Constitutional: Negative for malaise/fatigue.   Respiratory: Negative for shortness of breath.    Cardiovascular: Negative for chest pain.   Gastrointestinal: Negative for abdominal pain and nausea.   Musculoskeletal: Negative for myalgias.   Neurological: Negative for weakness.   All other systems reviewed and are negative.       Physical Exam  Temp:  [36.5 °C (97.7 °F)-37.2 °C (99 °F)] 36.8 °C (98.2 °F)  Pulse:  [62-88] 88  Resp:  [18-20] 18  BP: (147-194)/() 166/103  SpO2:  [93 %-96 %] 95 %    Physical Exam  Vitals and nursing note reviewed.    Constitutional:       General: She is not in acute distress.     Appearance: She is obese. She is not toxic-appearing.   HENT:      Head: Normocephalic.      Mouth/Throat:      Mouth: Mucous membranes are moist.   Eyes:      General:         Right eye: No discharge.         Left eye: No discharge.   Cardiovascular:      Rate and Rhythm: Normal rate and regular rhythm.   Pulmonary:      Effort: Pulmonary effort is normal. No respiratory distress.      Breath sounds: No wheezing or rales.   Abdominal:      Palpations: Abdomen is soft.      Tenderness: There is no abdominal tenderness. There is no guarding or rebound.   Musculoskeletal:         General: No swelling.      Cervical back: Neck supple.   Skin:     General: Skin is warm and dry.   Neurological:      Mental Status: She is alert and oriented to person, place, and time.         Fluids    Intake/Output Summary (Last 24 hours) at 1/3/2022 1737  Last data filed at 1/3/2022 1700  Gross per 24 hour   Intake 2075 ml   Output 1475 ml   Net 600 ml       Laboratory  Recent Labs     01/01/22  0945 01/02/22 0418 01/03/22 0413   WBC 11.5* 15.3* 12.2*   RBC 4.30 4.51 4.23   HEMOGLOBIN 11.1* 11.8* 10.9*   HEMATOCRIT 36.0* 37.4 35.2*   MCV 83.7 82.9 83.2   MCH 25.8* 26.2* 25.8*   MCHC 30.8* 31.6* 31.0*   RDW 48.9 49.4 49.6   PLATELETCT 264 296 251   MPV 10.2 10.7 10.0     Recent Labs     01/01/22  0945 01/02/22 0418 01/03/22 0413   SODIUM 137 138 141   POTASSIUM 3.7 3.5* 3.5*   CHLORIDE 103 103 105   CO2 21 24 23   GLUCOSE 267* 216* 172*   BUN 11 11 16   CREATININE 0.60 0.54 0.37*   CALCIUM 8.6 8.8 8.9     Recent Labs     12/31/21  1903   APTT 24.0*   INR 0.96               Imaging  DX-PORTABLE FLUORO > 1 HOUR   Final Result      1.  No evidence of intraluminal filling defects at the final images of ERCP with good filling of the common duct and partial filling of proximal intrahepatic branches.      NM-HEPATOBILIARY SCAN   Final Result      1.  No common bile duct or  small bowel activity identified at 60 minutes. This may represent postoperative biliary obstruction.      2.  No evidence of bile leak.      3.  Delayed imaging was not obtained as the patient was taken for ERCP examination.      US-ABDOMEN COMPLETE SURVEY   Final Result      1.  Diffuse fatty infiltration appearance of the liver. No hepatic mass or biliary dilatation identified.      2.  Cholecystectomy. Dilated common bile duct measuring 11.1 mm. No choledocholithiasis identified.      3.  Limited visualization of the pancreas, aorta, and IVC.         [id number]   Final Result      There is a large, 3 cm stone filling the gallbladder. No choledocholithiasis.      US-RUQ   Final Result      1.  There is a wall echo shadow sign of the gallbladder which may be due to a gallbladder filled with stones or porcelain gallbladder.   2.  Common bile duct is dilated measuring up to 1.2 cm.   3.  Hepatic steatosis.           Assessment/Plan  * Calculus of gallbladder with acute cholecystitis and obstruction- (present on admission)  Assessment & Plan  Zosyn, IV fluid, symptomatic management.  MRCP with large 3cm gallstone seen, no biliary stone.  S/p labp gina 12/31  LFTs decreased after ERCP  Diet ordered    Primary hypertension  Assessment & Plan  Consistently hypertensive  Also with DM  Started on lisinopril, increase to 10mg  Add norvasc    Leukocytosis (leucocytosis)  Assessment & Plan  Secondary to acute cholecystitis  Source control lap gina 12/31. ERCP 1/2  Continue CTX+flagyl    Transaminitis  Assessment & Plan  Secondary to large infected gallstone.  Increased after surgery  S/p ERCP  Decreased    Class 3 severe obesity in adult (HCC)- (present on admission)  Assessment & Plan  Patient will need outpatient diet and exercise program.    Type 2 diabetes mellitus without complication, without long-term current use of insulin (HCC)- (present on admission)  Assessment & Plan  A1c 7.3%  I discussed diagnosis with  her, she would like to start medication and she will set up PCP for follow up  Can start metformin on discharge, I discussed side effects with her  DM education ordered       VTE prophylaxis: enoxaparin ppx    I have performed a physical exam and reviewed and updated ROS and Plan today (1/3/2022). In review of yesterday's note (1/2/2022), there are no changes except as documented above.

## 2022-01-03 NOTE — PROGRESS NOTES
Bedside report received.  Assessment complete.  A&O x 4. Patient calls appropriately.  Patient is up self. Bed alarm off.   Patient has 0/10 pain.  Denies N&V. Pt is NPO.  4 lap sites with dermabond, CDI.  + void, + flatus, - BM.  Patient denies SOB.  SCD's on.  Patient interaction used with an .  Review plan with of care with patient. Call light and personal belongings within reach. Hourly rounding in place. All needs met at this time.

## 2022-01-03 NOTE — DISCHARGE PLANNING
Care Transition Team Discharge Planning    Anticipates discharge disposition:  • Home    Action:  • Per rounds with Dr Devi the plan is to discharge Pt to home pending G.I. clearance.    Barriers to Discharge:  • Pending medical clearance'    Plan:  • CM to continue to assist Pt with discharge as needed

## 2022-01-04 PROBLEM — K80.50 CHOLEDOCHOLITHIASIS: Status: ACTIVE | Noted: 2022-01-04

## 2022-01-04 PROBLEM — E87.6 HYPOKALEMIA: Status: ACTIVE | Noted: 2022-01-04

## 2022-01-04 PROBLEM — I16.0 HYPERTENSIVE URGENCY: Status: ACTIVE | Noted: 2022-01-01

## 2022-01-04 LAB
ALBUMIN SERPL BCP-MCNC: 3.8 G/DL (ref 3.2–4.9)
ALBUMIN/GLOB SERPL: 1 G/DL
ALP SERPL-CCNC: 462 U/L (ref 30–99)
ALT SERPL-CCNC: 282 U/L (ref 2–50)
ANION GAP SERPL CALC-SCNC: 18 MMOL/L (ref 7–16)
AST SERPL-CCNC: 70 U/L (ref 12–45)
BASOPHILS # BLD AUTO: 0.5 % (ref 0–1.8)
BASOPHILS # BLD: 0.06 K/UL (ref 0–0.12)
BILIRUB SERPL-MCNC: 1 MG/DL (ref 0.1–1.5)
BUN SERPL-MCNC: 14 MG/DL (ref 8–22)
CALCIUM SERPL-MCNC: 8.9 MG/DL (ref 8.5–10.5)
CHLORIDE SERPL-SCNC: 98 MMOL/L (ref 96–112)
CO2 SERPL-SCNC: 19 MMOL/L (ref 20–33)
CREAT SERPL-MCNC: 0.41 MG/DL (ref 0.5–1.4)
EOSINOPHIL # BLD AUTO: 0.2 K/UL (ref 0–0.51)
EOSINOPHIL NFR BLD: 1.5 % (ref 0–6.9)
ERYTHROCYTE [DISTWIDTH] IN BLOOD BY AUTOMATED COUNT: 48.5 FL (ref 35.9–50)
GLOBULIN SER CALC-MCNC: 3.7 G/DL (ref 1.9–3.5)
GLUCOSE BLD-MCNC: 106 MG/DL (ref 65–99)
GLUCOSE BLD-MCNC: 114 MG/DL (ref 65–99)
GLUCOSE BLD-MCNC: 130 MG/DL (ref 65–99)
GLUCOSE BLD-MCNC: 131 MG/DL (ref 65–99)
GLUCOSE BLD-MCNC: 132 MG/DL (ref 65–99)
GLUCOSE SERPL-MCNC: 124 MG/DL (ref 65–99)
HCT VFR BLD AUTO: 40.3 % (ref 37–47)
HGB BLD-MCNC: 12.8 G/DL (ref 12–16)
IMM GRANULOCYTES # BLD AUTO: 0.11 K/UL (ref 0–0.11)
IMM GRANULOCYTES NFR BLD AUTO: 0.8 % (ref 0–0.9)
LYMPHOCYTES # BLD AUTO: 3.49 K/UL (ref 1–4.8)
LYMPHOCYTES NFR BLD: 26.6 % (ref 22–41)
MAGNESIUM SERPL-MCNC: 2.2 MG/DL (ref 1.5–2.5)
MCH RBC QN AUTO: 26.2 PG (ref 27–33)
MCHC RBC AUTO-ENTMCNC: 31.8 G/DL (ref 33.6–35)
MCV RBC AUTO: 82.6 FL (ref 81.4–97.8)
MONOCYTES # BLD AUTO: 0.6 K/UL (ref 0–0.85)
MONOCYTES NFR BLD AUTO: 4.6 % (ref 0–13.4)
NEUTROPHILS # BLD AUTO: 8.66 K/UL (ref 2–7.15)
NEUTROPHILS NFR BLD: 66 % (ref 44–72)
NRBC # BLD AUTO: 0.02 K/UL
NRBC BLD-RTO: 0.2 /100 WBC
PHOSPHATE SERPL-MCNC: 4 MG/DL (ref 2.5–4.5)
PLATELET # BLD AUTO: 308 K/UL (ref 164–446)
PMV BLD AUTO: 10.1 FL (ref 9–12.9)
POTASSIUM SERPL-SCNC: 3.7 MMOL/L (ref 3.6–5.5)
PROT SERPL-MCNC: 7.5 G/DL (ref 6–8.2)
RBC # BLD AUTO: 4.88 M/UL (ref 4.2–5.4)
SODIUM SERPL-SCNC: 135 MMOL/L (ref 135–145)
WBC # BLD AUTO: 13.1 K/UL (ref 4.8–10.8)

## 2022-01-04 PROCEDURE — 82962 GLUCOSE BLOOD TEST: CPT

## 2022-01-04 PROCEDURE — A9270 NON-COVERED ITEM OR SERVICE: HCPCS | Performed by: INTERNAL MEDICINE

## 2022-01-04 PROCEDURE — 700105 HCHG RX REV CODE 258: Performed by: INTERNAL MEDICINE

## 2022-01-04 PROCEDURE — 700111 HCHG RX REV CODE 636 W/ 250 OVERRIDE (IP): Performed by: INTERNAL MEDICINE

## 2022-01-04 PROCEDURE — 770001 HCHG ROOM/CARE - MED/SURG/GYN PRIV*

## 2022-01-04 PROCEDURE — 99232 SBSQ HOSP IP/OBS MODERATE 35: CPT | Performed by: FAMILY MEDICINE

## 2022-01-04 PROCEDURE — 700102 HCHG RX REV CODE 250 W/ 637 OVERRIDE(OP): Performed by: FAMILY MEDICINE

## 2022-01-04 PROCEDURE — 80053 COMPREHEN METABOLIC PANEL: CPT

## 2022-01-04 PROCEDURE — 83735 ASSAY OF MAGNESIUM: CPT

## 2022-01-04 PROCEDURE — 84100 ASSAY OF PHOSPHORUS: CPT

## 2022-01-04 PROCEDURE — 700102 HCHG RX REV CODE 250 W/ 637 OVERRIDE(OP): Performed by: INTERNAL MEDICINE

## 2022-01-04 PROCEDURE — 85025 COMPLETE CBC W/AUTO DIFF WBC: CPT

## 2022-01-04 PROCEDURE — 99024 POSTOP FOLLOW-UP VISIT: CPT | Performed by: SURGERY

## 2022-01-04 PROCEDURE — A9270 NON-COVERED ITEM OR SERVICE: HCPCS | Performed by: FAMILY MEDICINE

## 2022-01-04 RX ORDER — LABETALOL HYDROCHLORIDE 5 MG/ML
10 INJECTION, SOLUTION INTRAVENOUS EVERY 4 HOURS PRN
Status: DISCONTINUED | OUTPATIENT
Start: 2022-01-04 | End: 2022-01-05 | Stop reason: HOSPADM

## 2022-01-04 RX ORDER — LISINOPRIL 20 MG/1
20 TABLET ORAL
Status: DISCONTINUED | OUTPATIENT
Start: 2022-01-05 | End: 2022-01-05 | Stop reason: HOSPADM

## 2022-01-04 RX ADMIN — METRONIDAZOLE 500 MG: 500 TABLET ORAL at 13:58

## 2022-01-04 RX ADMIN — METRONIDAZOLE 500 MG: 500 TABLET ORAL at 23:09

## 2022-01-04 RX ADMIN — CEFTRIAXONE SODIUM 2 G: 2 INJECTION, POWDER, FOR SOLUTION INTRAMUSCULAR; INTRAVENOUS at 05:52

## 2022-01-04 RX ADMIN — LABETALOL HYDROCHLORIDE 10 MG: 5 INJECTION, SOLUTION INTRAVENOUS at 11:06

## 2022-01-04 RX ADMIN — METRONIDAZOLE 500 MG: 500 TABLET ORAL at 05:57

## 2022-01-04 RX ADMIN — ENOXAPARIN SODIUM 40 MG: 40 INJECTION SUBCUTANEOUS at 05:57

## 2022-01-04 RX ADMIN — LISINOPRIL 10 MG: 10 TABLET ORAL at 05:57

## 2022-01-04 ASSESSMENT — ENCOUNTER SYMPTOMS
BACK PAIN: 0
WHEEZING: 0
FOCAL WEAKNESS: 0
ABDOMINAL PAIN: 1
NECK PAIN: 0
HEADACHES: 0
FLANK PAIN: 0
DIZZINESS: 0
VOMITING: 0
CHILLS: 0
NAUSEA: 0
COUGH: 0
MYALGIAS: 0
PALPITATIONS: 0
SENSORY CHANGE: 0
BLURRED VISION: 0
FEVER: 0
NERVOUS/ANXIOUS: 0
DIAPHORESIS: 0
SHORTNESS OF BREATH: 0
WEAKNESS: 0
SORE THROAT: 0
SPEECH CHANGE: 0
HEARTBURN: 0
DIARRHEA: 0

## 2022-01-04 ASSESSMENT — PAIN DESCRIPTION - PAIN TYPE
TYPE: ACUTE PAIN

## 2022-01-04 NOTE — CARE PLAN
Problem: Knowledge Deficit - Standard  Goal: Patient and family/care givers will demonstrate understanding of plan of care, disease process/condition, diagnostic tests and medications  Outcome: Progressing     Problem: Pain - Standard  Goal: Alleviation of pain or a reduction in pain to the patient’s comfort goal  Outcome: Progressing     The patient is Stable - Low risk of patient condition declining or worsening    Shift Goals  Clinical Goals: BP control, discharge  Patient Goals: rest    Progress made toward(s) clinical / shift goals: Discharge on hold until BP is better controlled. New antihypertensive meds ordered for patient. SBP now in 150s.     Patient is not progressing towards the following goals:

## 2022-01-04 NOTE — PROGRESS NOTES
AA&Ox4.   RA. Denies SOB.   Denies any pain.  Lap sites x 4 KAUSHIK and well approximated with dermabond.  Diabetic diet ordered. Denies N/V.  + void. LBM 1/3.   Pt upself.  All needs met at this time. Call light within reach. Pt calls appropriately.

## 2022-01-04 NOTE — CARE PLAN
Shift Goals  Clinical Goals: bp control; d/c tomorrow  Patient Goals: sleep    Progress made toward(s) clinical / shift goals:     Patient understands plan of care, is compliant. Denies pain at this time understands to call if pain treatment is needed   Problem: Knowledge Deficit - Standard  Goal: Patient and family/care givers will demonstrate understanding of plan of care, disease process/condition, diagnostic tests and medications  Outcome: Progressing     Problem: Pain - Standard  Goal: Alleviation of pain or a reduction in pain to the patient’s comfort goal  Outcome: Progressing

## 2022-01-04 NOTE — PROGRESS NOTES
[facility] Medicine Daily Progress Note    Date of Service  1/4/2022    Chief Complaint  Darling Islas is a 51 y.o. female admitted 12/30/2021 with cholecystitis.    Hospital Course  Admitted with cholecystitis and cholelithiasis.  Patient was started on empiric coverage with IV Zosyn.  Surgery and gastroenterology were consulted on the case.  MRCP did not show choledocholithiasis.  Patient underwent laparoscopic cholecystectomy on 12/31/2021.  Post procedure, her LFTs trended up.  Patient then underwent ERCP which showed Cholangiogram revealed the presence of small filling defects in the mid to distal common bile duct consistent with stones., After biliary sphincterotomy, common bile duct was dredged and 3 stones ranging in size from 3 to 5 mm were removed, Final occlusion cholangiogram revealed no retained filling defects and normal filling of intrahepatic ducts, Cystic duct stump appeared intact with clips in place.  After this procedure she was also known to have hypertensive urgency.    Interval Problem Update  Cholecystitis -tolerating regular diet  Diabetes - BS 130s  Hypertension - sbp 120-170    A qualified  was used to interpret Greenlandic during this encounter. ’s name/ID number was [id number] and mode of interpretation was iPad.The content of the interpretation included History/Visit information, Patient Education, Orders, Medications,Other (plan of care).    I have personally seen and examined the patient at bedside. I discussed the plan of care with patient, bedside RN, charge RN and .    Consultants/Specialty  general surgery    Code Status  Full Code    Disposition  Patient is not medically cleared for discharge.   Anticipate discharge to to home with close outpatient follow-up.  I have placed the appropriate orders for post-discharge needs.    Review of Systems  Review of Systems   Constitutional: Negative for chills, diaphoresis, fever and malaise/fatigue.    HENT: Negative for congestion, hearing loss and sore throat.    Eyes: Negative for blurred vision.   Respiratory: Negative for cough, shortness of breath and wheezing.    Cardiovascular: Negative for chest pain, palpitations and leg swelling.   Gastrointestinal: Positive for abdominal pain. Negative for diarrhea, heartburn, nausea and vomiting.   Genitourinary: Negative for dysuria, flank pain and hematuria.   Musculoskeletal: Negative for back pain, joint pain, myalgias and neck pain.   Skin: Negative for rash.   Neurological: Negative for dizziness, sensory change, speech change, focal weakness, weakness and headaches.   Psychiatric/Behavioral: The patient is not nervous/anxious.         Physical Exam  Temp:  [36.7 °C (98 °F)-37.4 °C (99.3 °F)] 36.8 °C (98.3 °F)  Pulse:  [82-96] 88  Resp:  [18] 18  BP: (127-179)/() 161/93  SpO2:  [94 %-97 %] 97 %    Physical Exam  Vitals and nursing note reviewed.   Constitutional:       Appearance: She is obese.   HENT:      Head: Normocephalic and atraumatic.      Nose: No congestion.      Mouth/Throat:      Mouth: Mucous membranes are moist.   Eyes:      Extraocular Movements: Extraocular movements intact.      Conjunctiva/sclera: Conjunctivae normal.   Cardiovascular:      Rate and Rhythm: Normal rate and regular rhythm.   Pulmonary:      Effort: Pulmonary effort is normal.      Breath sounds: Normal breath sounds.   Abdominal:      General: There is no distension.      Tenderness: There is abdominal tenderness (mild). There is no guarding or rebound.   Musculoskeletal:      Cervical back: No tenderness.      Right lower leg: No edema.      Left lower leg: No edema.   Skin:     General: Skin is warm and dry.   Neurological:      General: No focal deficit present.      Mental Status: She is alert and oriented to person, place, and time.      Cranial Nerves: No cranial nerve deficit.         Fluids    Intake/Output Summary (Last 24 hours) at 1/4/2022 1604  Last data  filed at 1/4/2022 0900  Gross per 24 hour   Intake 810 ml   Output 1550 ml   Net -740 ml       Laboratory  Recent Labs     01/02/22 0418 01/03/22 0413 01/04/22  0826   WBC 15.3* 12.2* 13.1*   RBC 4.51 4.23 4.88   HEMOGLOBIN 11.8* 10.9* 12.8   HEMATOCRIT 37.4 35.2* 40.3   MCV 82.9 83.2 82.6   MCH 26.2* 25.8* 26.2*   MCHC 31.6* 31.0* 31.8*   RDW 49.4 49.6 48.5   PLATELETCT 296 251 308   MPV 10.7 10.0 10.1     Recent Labs     01/02/22 0418 01/03/22 0413 01/04/22  0826   SODIUM 138 141 135   POTASSIUM 3.5* 3.5* 3.7   CHLORIDE 103 105 98   CO2 24 23 19*   GLUCOSE 216* 172* 124*   BUN 11 16 14   CREATININE 0.54 0.37* 0.41*   CALCIUM 8.8 8.9 8.9                   Imaging  DX-PORTABLE FLUORO > 1 HOUR   Final Result      1.  No evidence of intraluminal filling defects at the final images of ERCP with good filling of the common duct and partial filling of proximal intrahepatic branches.      NM-HEPATOBILIARY SCAN   Final Result      1.  No common bile duct or small bowel activity identified at 60 minutes. This may represent postoperative biliary obstruction.      2.  No evidence of bile leak.      3.  Delayed imaging was not obtained as the patient was taken for ERCP examination.      US-ABDOMEN COMPLETE SURVEY   Final Result      1.  Diffuse fatty infiltration appearance of the liver. No hepatic mass or biliary dilatation identified.      2.  Cholecystectomy. Dilated common bile duct measuring 11.1 mm. No choledocholithiasis identified.      3.  Limited visualization of the pancreas, aorta, and IVC.         [id number]   Final Result      There is a large, 3 cm stone filling the gallbladder. No choledocholithiasis.      US-RUQ   Final Result      1.  There is a wall echo shadow sign of the gallbladder which may be due to a gallbladder filled with stones or porcelain gallbladder.   2.  Common bile duct is dilated measuring up to 1.2 cm.   3.  Hepatic steatosis.           Assessment/Plan  * Calculus of gallbladder with  acute cholecystitis and obstruction- (present on admission)  Assessment & Plan  Laparoscopic cholecystectomy 12/31/2021  IV Rocephin and Flagyl  Pain control, encourage I-S    Choledocholithiasis- (present on admission)  Assessment & Plan  ERCP - Cholangiogram revealed the presence of small filling defects in the mid to distal common bile duct consistent with stones.  After biliary sphincterotomy, common bile duct was dredged and 3 stones ranging in size from 3 to 5 mm were removed.  Final occlusion cholangiogram revealed no retained filling defects and normal filling of intrahepatic ducts.  Cystic duct stump appeared intact with clips in place.   1/2/2022    Hypertensive urgency- (present on admission)  Assessment & Plan  Increase lisinopril to 20 mg  IV labetalol and hydralazine as needed with parameters    Hypokalemia- (present on admission)  Assessment & Plan  Follow bmp    Type 2 diabetes mellitus without complication, without long-term current use of insulin (HCC)- (present on admission)  Assessment & Plan  SSI    Class 3 severe obesity in adult (HCC)- (present on admission)  Assessment & Plan  Body mass index is 41.38 kg/m².       VTE prophylaxis: enoxaparin ppx    I have performed a physical exam and reviewed and updated ROS and Plan today (1/4/2022). In review of yesterday's note (1/3/2022), there are no changes except as documented above.

## 2022-01-04 NOTE — DISCHARGE PLANNING
Anticipated Discharge Disposition: Home    Action: LSW send a e-mail to [employer] to clarify if patient will only be eligible for Emergency Medicaid, pending response.    Plan: As Above.

## 2022-01-04 NOTE — PROGRESS NOTES
Assessment done  Pt AOX4  Pleasant. This nurse able to communicate with working knowledge of Vietnamese. Patient understands care plan and asks questions appropriately  At this time, VSS HTN improving  Pt is upself tolerating well  Pt calls for assistance as needed  4xlapsites dermabond CDI  Call light in place  Bed in lowest position, locked   access available if needed at moment notice  Will cont to monitor

## 2022-01-04 NOTE — DISCHARGE PLANNING
Anticipated Discharge Disposition: Home    Action: LSW send a e-mail to [employer] to clarify if patient will only be eligible for Emergency Medicaid, pending response.    Plan: As Above.    2:30pm - Per AUREA Looney, patient is may be eligible for Emergency Medicaid Only.    PLAN: As Above.

## 2022-01-04 NOTE — DISCHARGE PLANNING
Care Transition Team Discharge Planning    Anticipates discharge disposition:  • Home    Action:  • Per Dr Sexton in rounds today, plan is to discharge Pt to home pending lab results/ liver enzymes test    Barriers to Discharge:  • Pending medical clearance    Plan:  • CM to continue to assist Pt with discharge as needed

## 2022-01-04 NOTE — PROGRESS NOTES
DATE: 1/4/2022    Post Operative Day  4 laparoscopic cholecystectomy.    INTERVAL EVENTS:  S/p ERCP with stone extraction, bilirubin resolved.   Incisions are c/d/i.   Tolerating a diet    PHYSICAL EXAMINATION:  Vital Signs: BP (!) 179/106   Pulse 96   Temp 36.7 °C (98 °F) (Temporal)   Resp 18   Ht 1.524 m (5')   Wt 96.1 kg (211 lb 13.8 oz)   SpO2 94%     Physical Exam  Vitals and nursing note reviewed.   Constitutional:       General: She is not in acute distress.     Appearance: She is obese. She is not toxic-appearing.   Cardiovascular:      Rate and Rhythm: Normal rate and regular rhythm.   Pulmonary:      Effort: Pulmonary effort is normal. No respiratory distress.   Abdominal:      General: Abdomen is protuberant. There is no distension.      Palpations: Abdomen is soft.      Tenderness: There is no abdominal tenderness.      Comments: 4 incision CDI   Neurological:      Mental Status: She is alert.       .    LABORATORY VALUES:   Recent Labs     01/02/22 0418 01/03/22 0413 01/04/22  0826   WBC 15.3* 12.2* 13.1*   RBC 4.51 4.23 4.88   HEMOGLOBIN 11.8* 10.9* 12.8   HEMATOCRIT 37.4 35.2* 40.3   MCV 82.9 83.2 82.6   MCH 26.2* 25.8* 26.2*   MCHC 31.6* 31.0* 31.8*   RDW 49.4 49.6 48.5   PLATELETCT 296 251 308   MPV 10.7 10.0 10.1     Recent Labs     01/02/22 0418 01/03/22 0413 01/04/22  0826   SODIUM 138 141 135   POTASSIUM 3.5* 3.5* 3.7   CHLORIDE 103 105 98   CO2 24 23 19*   GLUCOSE 216* 172* 124*   BUN 11 16 14   CREATININE 0.54 0.37* 0.41*   CALCIUM 8.8 8.9 8.9     Recent Labs     01/02/22 0418 01/03/22 0413 01/04/22  0826   ASTSGOT 448* 228* 70*   ALTSGPT 514* 429* 282*   TBILIRUBIN 2.8* 3.0* 1.0   IBILIRUBIN 0.9  --   --    DBILIRUBIN 1.9*  --   --    ALKPHOSPHAT 510* 484* 462*   GLOBULIN 3.4 2.9 3.7*            IMAGING:   DX-PORTABLE FLUORO > 1 HOUR   Final Result      1.  No evidence of intraluminal filling defects at the final images of ERCP with good filling of the common duct and partial  filling of proximal intrahepatic branches.      NM-HEPATOBILIARY SCAN   Final Result      1.  No common bile duct or small bowel activity identified at 60 minutes. This may represent postoperative biliary obstruction.      2.  No evidence of bile leak.      3.  Delayed imaging was not obtained as the patient was taken for ERCP examination.      US-ABDOMEN COMPLETE SURVEY   Final Result      1.  Diffuse fatty infiltration appearance of the liver. No hepatic mass or biliary dilatation identified.      2.  Cholecystectomy. Dilated common bile duct measuring 11.1 mm. No choledocholithiasis identified.      3.  Limited visualization of the pancreas, aorta, and IVC.         [id number]   Final Result      There is a large, 3 cm stone filling the gallbladder. No choledocholithiasis.      US-RUQ   Final Result      1.  There is a wall echo shadow sign of the gallbladder which may be due to a gallbladder filled with stones or porcelain gallbladder.   2.  Common bile duct is dilated measuring up to 1.2 cm.   3.  Hepatic steatosis.          ASSESSMENT AND PLAN:     * Calculus of gallbladder with acute cholecystitis and obstruction- (present on admission)  Assessment & Plan  12/31 Lap gina  1/2 Uptrend in LFTs and bilirubin  1/2 ERCP  1/3 home if tolerating diet, follow up Alphonse 2-3 weeks   1/4 home today ok from surgical standpoint, bili resolved  Dr. Shahid Cunha, [facility]         ____________________________________     Kelsea Cunha M.D.    DD: 1/1/2022  9:06 AM

## 2022-01-04 NOTE — ASSESSMENT & PLAN NOTE
ERCP - Cholangiogram revealed the presence of small filling defects in the mid to distal common bile duct consistent with stones.  After biliary sphincterotomy, common bile duct was dredged and 3 stones ranging in size from 3 to 5 mm were removed.  Final occlusion cholangiogram revealed no retained filling defects and normal filling of intrahepatic ducts.  Cystic duct stump appeared intact with clips in place.   1/2/2022

## 2022-01-05 ENCOUNTER — PHARMACY VISIT (OUTPATIENT)
Dept: PHARMACY | Facility: MEDICAL CENTER | Age: 52
End: 2022-01-05
Payer: COMMERCIAL

## 2022-01-05 VITALS
OXYGEN SATURATION: 96 % | WEIGHT: 211.86 LBS | BODY MASS INDEX: 41.59 KG/M2 | HEIGHT: 60 IN | DIASTOLIC BLOOD PRESSURE: 88 MMHG | SYSTOLIC BLOOD PRESSURE: 156 MMHG | HEART RATE: 74 BPM | TEMPERATURE: 98.1 F | RESPIRATION RATE: 18 BRPM

## 2022-01-05 LAB
ALBUMIN SERPL BCP-MCNC: 3.9 G/DL (ref 3.2–4.9)
ALBUMIN/GLOB SERPL: 1.1 G/DL
ALP SERPL-CCNC: 404 U/L (ref 30–99)
ALT SERPL-CCNC: 204 U/L (ref 2–50)
ANION GAP SERPL CALC-SCNC: 16 MMOL/L (ref 7–16)
AST SERPL-CCNC: 66 U/L (ref 12–45)
BASOPHILS # BLD AUTO: 0.5 % (ref 0–1.8)
BASOPHILS # BLD: 0.06 K/UL (ref 0–0.12)
BILIRUB SERPL-MCNC: 0.9 MG/DL (ref 0.1–1.5)
BUN SERPL-MCNC: 19 MG/DL (ref 8–22)
CALCIUM SERPL-MCNC: 9.1 MG/DL (ref 8.5–10.5)
CHLORIDE SERPL-SCNC: 100 MMOL/L (ref 96–112)
CO2 SERPL-SCNC: 19 MMOL/L (ref 20–33)
CREAT SERPL-MCNC: 0.47 MG/DL (ref 0.5–1.4)
EOSINOPHIL # BLD AUTO: 0.19 K/UL (ref 0–0.51)
EOSINOPHIL NFR BLD: 1.6 % (ref 0–6.9)
ERYTHROCYTE [DISTWIDTH] IN BLOOD BY AUTOMATED COUNT: 48.2 FL (ref 35.9–50)
GLOBULIN SER CALC-MCNC: 3.4 G/DL (ref 1.9–3.5)
GLUCOSE BLD-MCNC: 124 MG/DL (ref 65–99)
GLUCOSE SERPL-MCNC: 135 MG/DL (ref 65–99)
HCT VFR BLD AUTO: 41.4 % (ref 37–47)
HGB BLD-MCNC: 13.2 G/DL (ref 12–16)
IMM GRANULOCYTES # BLD AUTO: 0.12 K/UL (ref 0–0.11)
IMM GRANULOCYTES NFR BLD AUTO: 1 % (ref 0–0.9)
LYMPHOCYTES # BLD AUTO: 2.95 K/UL (ref 1–4.8)
LYMPHOCYTES NFR BLD: 25.1 % (ref 22–41)
MCH RBC QN AUTO: 26.3 PG (ref 27–33)
MCHC RBC AUTO-ENTMCNC: 31.9 G/DL (ref 33.6–35)
MCV RBC AUTO: 82.6 FL (ref 81.4–97.8)
MONOCYTES # BLD AUTO: 0.63 K/UL (ref 0–0.85)
MONOCYTES NFR BLD AUTO: 5.4 % (ref 0–13.4)
NEUTROPHILS # BLD AUTO: 7.79 K/UL (ref 2–7.15)
NEUTROPHILS NFR BLD: 66.4 % (ref 44–72)
NRBC # BLD AUTO: 0 K/UL
NRBC BLD-RTO: 0 /100 WBC
PLATELET # BLD AUTO: 329 K/UL (ref 164–446)
PMV BLD AUTO: 10.2 FL (ref 9–12.9)
POTASSIUM SERPL-SCNC: 3.7 MMOL/L (ref 3.6–5.5)
PROT SERPL-MCNC: 7.3 G/DL (ref 6–8.2)
RBC # BLD AUTO: 5.01 M/UL (ref 4.2–5.4)
SODIUM SERPL-SCNC: 135 MMOL/L (ref 135–145)
TSH SERPL DL<=0.005 MIU/L-ACNC: 2.01 UIU/ML (ref 0.38–5.33)
WBC # BLD AUTO: 11.7 K/UL (ref 4.8–10.8)

## 2022-01-05 PROCEDURE — 700105 HCHG RX REV CODE 258: Performed by: FAMILY MEDICINE

## 2022-01-05 PROCEDURE — 700102 HCHG RX REV CODE 250 W/ 637 OVERRIDE(OP): Performed by: FAMILY MEDICINE

## 2022-01-05 PROCEDURE — 700111 HCHG RX REV CODE 636 W/ 250 OVERRIDE (IP): Performed by: FAMILY MEDICINE

## 2022-01-05 PROCEDURE — 700111 HCHG RX REV CODE 636 W/ 250 OVERRIDE (IP): Performed by: INTERNAL MEDICINE

## 2022-01-05 PROCEDURE — 80053 COMPREHEN METABOLIC PANEL: CPT

## 2022-01-05 PROCEDURE — 82962 GLUCOSE BLOOD TEST: CPT

## 2022-01-05 PROCEDURE — RXMED WILLOW AMBULATORY MEDICATION CHARGE: Performed by: FAMILY MEDICINE

## 2022-01-05 PROCEDURE — 84443 ASSAY THYROID STIM HORMONE: CPT

## 2022-01-05 PROCEDURE — 99239 HOSP IP/OBS DSCHRG MGMT >30: CPT | Performed by: FAMILY MEDICINE

## 2022-01-05 PROCEDURE — 99024 POSTOP FOLLOW-UP VISIT: CPT | Performed by: SURGERY

## 2022-01-05 PROCEDURE — 85025 COMPLETE CBC W/AUTO DIFF WBC: CPT

## 2022-01-05 PROCEDURE — A9270 NON-COVERED ITEM OR SERVICE: HCPCS | Performed by: FAMILY MEDICINE

## 2022-01-05 RX ORDER — ONDANSETRON 4 MG/1
4 TABLET, ORALLY DISINTEGRATING ORAL EVERY 6 HOURS PRN
Qty: 30 TABLET | Refills: 0 | Status: SHIPPED | OUTPATIENT
Start: 2022-01-05

## 2022-01-05 RX ORDER — LISINOPRIL 20 MG/1
20 TABLET ORAL DAILY
Qty: 30 TABLET | Refills: 2 | Status: SHIPPED | OUTPATIENT
Start: 2022-01-06

## 2022-01-05 RX ORDER — METRONIDAZOLE 500 MG/1
500 TABLET ORAL EVERY 8 HOURS
Qty: 12 TABLET | Refills: 0 | Status: SHIPPED | OUTPATIENT
Start: 2022-01-05 | End: 2022-01-09

## 2022-01-05 RX ORDER — OXYCODONE HYDROCHLORIDE 5 MG/1
5 TABLET ORAL EVERY 6 HOURS PRN
Qty: 20 TABLET | Refills: 0 | Status: SHIPPED | OUTPATIENT
Start: 2022-01-05 | End: 2022-01-10

## 2022-01-05 RX ORDER — CEFDINIR 300 MG/1
300 CAPSULE ORAL 2 TIMES DAILY
Qty: 8 CAPSULE | Refills: 0 | Status: SHIPPED | OUTPATIENT
Start: 2022-01-05 | End: 2022-01-09

## 2022-01-05 RX ADMIN — LISINOPRIL 20 MG: 20 TABLET ORAL at 05:49

## 2022-01-05 RX ADMIN — CEFTRIAXONE SODIUM 2 G: 2 INJECTION, POWDER, FOR SOLUTION INTRAMUSCULAR; INTRAVENOUS at 05:46

## 2022-01-05 RX ADMIN — METRONIDAZOLE 500 MG: 500 TABLET ORAL at 05:49

## 2022-01-05 RX ADMIN — ENOXAPARIN SODIUM 40 MG: 40 INJECTION SUBCUTANEOUS at 05:49

## 2022-01-05 ASSESSMENT — PAIN DESCRIPTION - PAIN TYPE: TYPE: SURGICAL PAIN

## 2022-01-05 NOTE — CARE PLAN
Problem: Knowledge Deficit - Standard  Goal: Patient and family/care givers will demonstrate understanding of plan of care, disease process/condition, diagnostic tests and medications  Outcome: Progressing     Problem: Pain - Standard  Goal: Alleviation of pain or a reduction in pain to the patient’s comfort goal  Outcome: Progressing   The patient is Stable - Low risk of patient condition declining or worsening    Shift Goals  Clinical Goals: BP control  Patient Goals: sleep    Progress made toward(s) clinical / shift goals:  Parameters for PRN meds adjusted. PRN labetalol administered x1. BP improved later in shift.    Patient is not progressing towards the following goals:

## 2022-01-05 NOTE — DISCHARGE PLANNING
Care Transition Team Discharge Planning    Anticipates discharge disposition:  • Home    Action:  • Pt was discussed in Rounds and per Dr Sexton the plan is to discharge Pt today . Pt will need assistance with Meds to bed.    Barriers to Discharge:  • None    Plan:  • CM to continue to assist Pt with discharge as needed

## 2022-01-05 NOTE — DISCHARGE PLANNING
Meds-to-Beds: Discharge prescription orders listed below delivered to patient's bedside RN Kelsea. Patient counseled via phone in Urdu with assistance of pharmacy technician Shobha. Patient elected to have co-payment billed to patient account.      Current Outpatient Medications   Medication Sig Dispense Refill   • metFORMIN (GLUCOPHAGE) 500 MG Tab Take 1 Tablet by mouth 2 times a day with meals for 30 days. 60 Tablet 2   • [START ON 1/6/2022] lisinopril (PRINIVIL) 20 MG Tab Take 1 Tablet by mouth every day. 30 Tablet 2   • metroNIDAZOLE (FLAGYL) 500 MG Tab Take 1 Tablet by mouth every 8 hours for 4 days. 12 Tablet 0   • ondansetron (ZOFRAN ODT) 4 MG TABLET DISPERSIBLE Dissolve 1 Tablet by mouth every 6 hours as needed for Nausea. 30 Tablet 0   • cefdinir (OMNICEF) 300 MG Cap Take 1 Capsule by mouth 2 times a day for 4 days. 8 Capsule 0   • oxyCODONE immediate-release (ROXICODONE) 5 MG Tab Take 1 Tablet by mouth every 6 hours as needed for Severe Pain for up to 5 days. 20 Tablet 0      Sarah Carney, PharmD

## 2022-01-05 NOTE — PROGRESS NOTES
Pt in no acute distress no SOB. Discharge instruction information reviewed with pt. All questions answered. PIV removed. Prescriptions with patient.  here to  patient.

## 2022-01-05 NOTE — DISCHARGE SUMMARY
"Discharge Summary    CHIEF COMPLAINT ON ADMISSION  Chief Complaint   Patient presents with   • Epigastric Pain     x 5 days, patient reports \"a really strong pain.\" Patient states the pain started as intermittent but now is not going away. Pain radiates to the right side of the back. Patient states the pain is worse with eating and drinking.        Reason for Admission  Abd Pain     Admission Date  12/30/2021    CODE STATUS  Full Code    HPI & HOSPITAL COURSE  This is a 51 y.o. female here with cholecystitis and cholelithiasis.  Patient was started on empiric coverage with IV Zosyn.  Surgery and gastroenterology were consulted on the case.  MRCP did not show choledocholithiasis.  Patient underwent laparoscopic cholecystectomy on 12/31/2021.  Post procedure, her LFTs trended up.  Patient then underwent ERCP which showed Cholangiogram revealed the presence of small filling defects in the mid to distal common bile duct consistent with stones., After biliary sphincterotomy, common bile duct was dredged and 3 stones ranging in size from 3 to 5 mm were removed, Final occlusion cholangiogram revealed no retained filling defects and normal filling of intrahepatic ducts, Cystic duct stump appeared intact with clips in place.  After this procedure she was also known to have hypertensive urgency.  He was then started on lisinopril and doses were adjusted to get better blood pressure control.  Her LFTs trended down.  Surgery has cleared her for discharge.    Therefore, she is discharged in good and stable condition to home with close outpatient follow-up.    The patient met 2-midnight criteria for an inpatient stay at the time of discharge.    Discharge Date  1/5/2022    FOLLOW UP ITEMS POST DISCHARGE  Follow up with Surgery as scheduled    DISCHARGE DIAGNOSES  Principal Problem:    Calculus of gallbladder with acute cholecystitis and obstruction POA: Yes  Active Problems:    Hypertensive urgency POA: Yes    Choledocholithiasis " POA: Yes    Type 2 diabetes mellitus without complication, without long-term current use of insulin (HCC) POA: Yes    Hypokalemia POA: Yes    Class 3 severe obesity in adult (HCC) POA: Yes  Resolved Problems:    * No resolved hospital problems. *      FOLLOW UP  No future appointments.  Shahid Cunha D.O.  [address] [facility] [address]  [place] [address]  [phone]    In 1 week  Follow up after surgery. Seguimiento después de la cirugía.      MEDICATIONS ON DISCHARGE     Medication List      START taking these medications      Instructions   cefdinir 300 MG Caps  Commonly known as: OMNICEF   Take 1 Capsule by mouth 2 times a day for 4 days.  Dose: 300 mg     lisinopril 20 MG Tabs  Start taking on: January 6, 2022  Commonly known as: PRINIVIL   [place] 1 tableta por vía oral diariamente.  (Take 1 Tablet by mouth every day.)  Dose: 20 mg     metroNIDAZOLE 500 MG Tabs  Commonly known as: FLAGYL   Take 1 Tablet by mouth every 8 hours for 4 days.  Dose: 500 mg     ondansetron 4 MG Tbdp  Commonly known as: ZOFRAN ODT   Disolve 1 tableta por vía oral cada 6 horas según sea necesario para la náusea.  (Dissolve 1 Tablet by mouth every 6 hours as needed for Nausea.)  Dose: 4 mg     oxyCODONE immediate-release 5 MG Tabs  Commonly known as: ROXICODONE   Take 1 Tablet by mouth every 6 hours as needed for Severe Pain for up to 5 days.  Dose: 5 mg        CONTINUE taking these medications      Instructions   metFORMIN 500 MG Tabs  Commonly known as: GLUCOPHAGE   Take 1 Tablet by mouth 2 times a day with meals for 30 days.  Dose: 500 mg            Allergies  Allergies   Allergen Reactions   • Nkda [No Known Drug Allergy]        DIET  Orders Placed This Encounter   Procedures   • Diet Order Diet: Consistent CHO (Diabetic)     Standing Status:   Standing     Number of Occurrences:   1     Order Specific Question:   Diet:     Answer:   Consistent CHO (Diabetic) [4]       ACTIVITY  Light duty.  Weight bearing as  [facility]  Surgery [facility]  Gastroenterology - [place]    PROCEDURES  Laparoscopic cholecystectomy 12/31/2021    ERCP - Cholangiogram revealed the presence of small filling defects in the mid to distal common bile duct consistent with stones.  After biliary sphincterotomy, common bile duct was dredged and 3 stones ranging in size from 3 to 5 mm were removed.  Final occlusion cholangiogram revealed no retained filling defects and normal filling of intrahepatic ducts.  Cystic duct stump appeared intact with clips in place.   1/2/2022    LABORATORY  Lab Results   Component Value Date    SODIUM 135 01/05/2022    POTASSIUM 3.7 01/05/2022    CHLORIDE 100 01/05/2022    CO2 19 (L) 01/05/2022    GLUCOSE 135 (H) 01/05/2022    BUN 19 01/05/2022    CREATININE 0.47 (L) 01/05/2022        Lab Results   Component Value Date    WBC 11.7 (H) 01/05/2022    HEMOGLOBIN 13.2 01/05/2022    HEMATOCRIT 41.4 01/05/2022    PLATELETCT 329 01/05/2022        Total time of the discharge process exceeds 40 minutes.

## 2022-01-05 NOTE — PROGRESS NOTES
Assessment done  Pt AOX4  Family beside   asks questions appropriately  VSS; systolic BP still elevated but below PRN parameter  Pt is upself tolerating well  Pt calls for assistance as needed  4xlapsites dermabond CDI  Call light in place  Bed in lowest position, locked   access available if needed at moment notice  Will cont to monitor

## 2022-01-05 NOTE — CARE PLAN
Shift Goals  Clinical Goals: bp control  Patient Goals: sleep    Progress made toward(s) clinical / shift goals:      Patient understands plan of care; denies pAIN     Problem: Knowledge Deficit - Standard  Goal: Patient and family/care givers will demonstrate understanding of plan of care, disease process/condition, diagnostic tests and medications  Outcome: Progressing     Problem: Pain - Standard  Goal: Alleviation of pain or a reduction in pain to the patient’s comfort goal  Outcome: Met

## 2022-01-05 NOTE — PROGRESS NOTES
Bedside report received, assessment completed    A&O x  4, pt calls appropriately, Romansh speaking,  utilized   Mobility: Up self, no assistive devices needed   Fall Risk Assessment: n/a, bed alarm n/a  Pain Assessment: 0/10, medication provided per MAR  Diet: Diabetic Diet, tolerating   LDA:   IV Access: 20 LFA, CDI/ flushed/ Infusing SL/ Abx  + void, + flatus, + BM  DVT Prophylaxis: Lovenox, SCD +    Procedures: Lap Cholecystectomy  D/C Plan: Home with no needs     Reviewed plan of care with patient, bed in lowest position and locked, pt resting comfortably now, call light within reach, all needs met at this time. Interventions will be executed per plan of care

## 2022-01-05 NOTE — CARE PLAN
The patient is Stable - Low risk of patient condition declining or worsening    Shift Goals  Clinical Goals: LFT/ BP mgt  Patient Goals: rest    Progress made toward(s) clinical / shift goals:        Problem: Discharge Barriers/Planning  Goal: Patient's continuum of care needs are met  Outcome: Progressing - LFT reviewed by MD, no additional needs     Problem: Hemodynamics  Goal: Patient's hemodynamics, fluid balance and neurologic status will be stable or improve  Outcome: Progressing - BP controlled     Problem: Respiratory  Goal: Patient will achieve/maintain optimum respiratory ventilation and gas exchange  Outcome: Progressing- pt provided with education at bedside regarding IS usage    Call light and personal items within reach, no additional questions at this time

## 2022-01-05 NOTE — PROGRESS NOTES
DATE: 1/5/2022    Post Operative Day  5 laparoscopic cholecystectomy.    INTERVAL EVENTS:  S/p ERCP with stone extraction, bilirubin resolved.   Incisions are c/d/i.   Tolerating a diet  Blood pressure has been an issues, being addressed by primary.     PHYSICAL EXAMINATION:  Vital Signs: /88   Pulse 74   Temp 36.7 °C (98.1 °F) (Temporal)   Resp 18   Ht 1.524 m (5')   Wt 96.1 kg (211 lb 13.8 oz)   SpO2 96%     Physical Exam  Vitals and nursing note reviewed.   Constitutional:       General: She is not in acute distress.     Appearance: She is obese. She is not toxic-appearing.   Cardiovascular:      Rate and Rhythm: Normal rate and regular rhythm.   Pulmonary:      Effort: Pulmonary effort is normal. No respiratory distress.   Abdominal:      General: Abdomen is protuberant. There is no distension.      Palpations: Abdomen is soft.      Tenderness: There is no abdominal tenderness.      Comments: 4 incision CDI   Neurological:      Mental Status: She is alert.       .    LABORATORY VALUES:   Recent Labs     01/03/22 0413 01/04/22 0826 01/05/22  0534   WBC 12.2* 13.1* 11.7*   RBC 4.23 4.88 5.01   HEMOGLOBIN 10.9* 12.8 13.2   HEMATOCRIT 35.2* 40.3 41.4   MCV 83.2 82.6 82.6   MCH 25.8* 26.2* 26.3*   MCHC 31.0* 31.8* 31.9*   RDW 49.6 48.5 48.2   PLATELETCT 251 308 329   MPV 10.0 10.1 10.2     Recent Labs     01/03/22 0413 01/04/22  0826 01/05/22  0534   SODIUM 141 135 135   POTASSIUM 3.5* 3.7 3.7   CHLORIDE 105 98 100   CO2 23 19* 19*   GLUCOSE 172* 124* 135*   BUN 16 14 19   CREATININE 0.37* 0.41* 0.47*   CALCIUM 8.9 8.9 9.1     Recent Labs     01/03/22 0413 01/04/22  0826 01/05/22  0534   ASTSGOT 228* 70* 66*   ALTSGPT 429* 282* 204*   TBILIRUBIN 3.0* 1.0 0.9   ALKPHOSPHAT 484* 462* 404*   GLOBULIN 2.9 3.7* 3.4            IMAGING:   DX-PORTABLE FLUORO > 1 HOUR   Final Result      1.  No evidence of intraluminal filling defects at the final images of ERCP with good filling of the common duct and  partial filling of proximal intrahepatic branches.      NM-HEPATOBILIARY SCAN   Final Result      1.  No common bile duct or small bowel activity identified at 60 minutes. This may represent postoperative biliary obstruction.      2.  No evidence of bile leak.      3.  Delayed imaging was not obtained as the patient was taken for ERCP examination.      US-ABDOMEN COMPLETE SURVEY   Final Result      1.  Diffuse fatty infiltration appearance of the liver. No hepatic mass or biliary dilatation identified.      2.  Cholecystectomy. Dilated common bile duct measuring 11.1 mm. No choledocholithiasis identified.      3.  Limited visualization of the pancreas, aorta, and IVC.         [id number]-C/O   Final Result      There is a large, 3 cm stone filling the gallbladder. No choledocholithiasis.      US-RUQ   Final Result      1.  There is a wall echo shadow sign of the gallbladder which may be due to a gallbladder filled with stones or porcelain gallbladder.   2.  Common bile duct is dilated measuring up to 1.2 cm.   3.  Hepatic steatosis.          ASSESSMENT AND PLAN:     * Calculus of gallbladder with acute cholecystitis and obstruction- (present on admission)  Assessment & Plan  12/31 Lap gina  1/2 Uptrend in LFTs and bilirubin  1/2 ERCP  1/3 home if tolerating diet, follow up Al[facility]onse 2-3 weeks   1/4 home today ok from surgical standpoint, bili resolved  1/5 cleared from surgical perspective, will drop to standby.   Dr. Shahid Cunha, [facility]         ____________________________________     Kelsea Cunha M.D.    DD: 1/1/2022  9:06 AM

## 2025-07-17 ENCOUNTER — HOSPITAL ENCOUNTER (OUTPATIENT)
Dept: LAB | Facility: MEDICAL CENTER | Age: 55
End: 2025-07-17
Payer: COMMERCIAL

## 2025-07-17 ENCOUNTER — HOSPITAL ENCOUNTER (OUTPATIENT)
Facility: MEDICAL CENTER | Age: 55
End: 2025-07-17
Payer: COMMERCIAL

## 2025-07-17 LAB
ALBUMIN SERPL BCP-MCNC: 4 G/DL (ref 3.2–4.9)
ALBUMIN/GLOB SERPL: 1.3 G/DL
ALP SERPL-CCNC: 132 U/L (ref 30–99)
ALT SERPL-CCNC: 22 U/L (ref 2–50)
ANION GAP SERPL CALC-SCNC: 11 MMOL/L (ref 7–16)
AST SERPL-CCNC: 24 U/L (ref 12–45)
BASOPHILS # BLD AUTO: 0.8 % (ref 0–1.8)
BASOPHILS # BLD: 0.07 K/UL (ref 0–0.12)
BILIRUB SERPL-MCNC: 0.6 MG/DL (ref 0.1–1.5)
BUN SERPL-MCNC: 13 MG/DL (ref 8–22)
CALCIUM ALBUM COR SERPL-MCNC: 9.1 MG/DL (ref 8.5–10.5)
CALCIUM SERPL-MCNC: 9.1 MG/DL (ref 8.5–10.5)
CHLORIDE SERPL-SCNC: 104 MMOL/L (ref 96–112)
CO2 SERPL-SCNC: 21 MMOL/L (ref 20–33)
CREAT SERPL-MCNC: 0.61 MG/DL (ref 0.5–1.4)
EOSINOPHIL # BLD AUTO: 0.15 K/UL (ref 0–0.51)
EOSINOPHIL NFR BLD: 1.7 % (ref 0–6.9)
ERYTHROCYTE [DISTWIDTH] IN BLOOD BY AUTOMATED COUNT: 49.1 FL (ref 35.9–50)
GFR SERPLBLD CREATININE-BSD FMLA CKD-EPI: 107 ML/MIN/1.73 M 2
GLOBULIN SER CALC-MCNC: 3.2 G/DL (ref 1.9–3.5)
GLUCOSE SERPL-MCNC: 120 MG/DL (ref 65–99)
H PYLORI AG STL QL IA: DETECTED
HCT VFR BLD AUTO: 39.1 % (ref 37–47)
HGB BLD-MCNC: 12 G/DL (ref 12–16)
IMM GRANULOCYTES # BLD AUTO: 0.03 K/UL (ref 0–0.11)
IMM GRANULOCYTES NFR BLD AUTO: 0.3 % (ref 0–0.9)
LIPASE SERPL-CCNC: 22 U/L (ref 11–82)
LYMPHOCYTES # BLD AUTO: 3.14 K/UL (ref 1–4.8)
LYMPHOCYTES NFR BLD: 36.2 % (ref 22–41)
MCH RBC QN AUTO: 25.1 PG (ref 27–33)
MCHC RBC AUTO-ENTMCNC: 30.7 G/DL (ref 32.2–35.5)
MCV RBC AUTO: 81.6 FL (ref 81.4–97.8)
MONOCYTES # BLD AUTO: 0.46 K/UL (ref 0–0.85)
MONOCYTES NFR BLD AUTO: 5.3 % (ref 0–13.4)
NEUTROPHILS # BLD AUTO: 4.82 K/UL (ref 1.82–7.42)
NEUTROPHILS NFR BLD: 55.7 % (ref 44–72)
NRBC # BLD AUTO: 0 K/UL
NRBC BLD-RTO: 0 /100 WBC (ref 0–0.2)
PLATELET # BLD AUTO: 339 K/UL (ref 164–446)
PMV BLD AUTO: 10.8 FL (ref 9–12.9)
POTASSIUM SERPL-SCNC: 4 MMOL/L (ref 3.6–5.5)
PROT SERPL-MCNC: 7.2 G/DL (ref 6–8.2)
RBC # BLD AUTO: 4.79 M/UL (ref 4.2–5.4)
SODIUM SERPL-SCNC: 136 MMOL/L (ref 135–145)
WBC # BLD AUTO: 8.7 K/UL (ref 4.8–10.8)

## 2025-07-17 PROCEDURE — 36415 COLL VENOUS BLD VENIPUNCTURE: CPT

## 2025-07-17 PROCEDURE — 80053 COMPREHEN METABOLIC PANEL: CPT

## 2025-07-17 PROCEDURE — 83690 ASSAY OF LIPASE: CPT

## 2025-07-17 PROCEDURE — 87338 HPYLORI STOOL AG IA: CPT | Mod: 91

## 2025-07-17 PROCEDURE — 85025 COMPLETE CBC W/AUTO DIFF WBC: CPT

## 2025-07-17 PROCEDURE — 87338 HPYLORI STOOL AG IA: CPT

## 2025-07-18 ENCOUNTER — APPOINTMENT (OUTPATIENT)
Dept: LAB | Facility: MEDICAL CENTER | Age: 55
End: 2025-07-18
Payer: COMMERCIAL

## 2025-07-18 ENCOUNTER — HOSPITAL ENCOUNTER (OUTPATIENT)
Dept: LAB | Facility: MEDICAL CENTER | Age: 55
End: 2025-07-18
Payer: COMMERCIAL

## 2025-07-19 LAB — H PYLORI AG STL QL IA: DETECTED

## (undated) DEVICE — SET SUCTION/IRRIGATION WITH DISPOSABLE TIP (6/CA )PART #0250-070-520 IS A SUB

## (undated) DEVICE — SET LEADWIRE 5 LEAD BEDSIDE DISPOSABLE ECG (1SET OF 5/EA)

## (undated) DEVICE — SYRINGE DISP. 12 CC LL - (100/BX)

## (undated) DEVICE — CLOSURE SKIN STRIP 1/2 X 4 IN - (STERI STRIP) (50/BX 4BX/CA)

## (undated) DEVICE — PACK LAP CHOLE OR - (2EA/CA)

## (undated) DEVICE — TOWEL STOP TIMEOUT SAFETY FLAG (40EA/CA)

## (undated) DEVICE — ELECTRODE DUAL RETURN W/ CORD - (50/PK)

## (undated) DEVICE — BALLOON RETRIEVAL EXTRACTOR PRO RX   9-12MM

## (undated) DEVICE — WATER IRRIGATION STERILE 1000ML (12EA/CA)

## (undated) DEVICE — TROCAR LAPSCP 100MM 12MM NTHRD - (6/BX)

## (undated) DEVICE — KIT ANESTHESIA W/CIRCUIT & 3/LT BAG W/FILTER (20EA/CA)

## (undated) DEVICE — DRAPESURG STERI-DRAPE LONG - (10/BX 4BX/CA)

## (undated) DEVICE — PROTECTOR ULNA NERVE - (36PR/CA)

## (undated) DEVICE — MASK ANESTHESIA ADULT  - (100/CA)

## (undated) DEVICE — GLOVE BIOGEL PI ORTHO SZ 8 PF LF (40PR/BX)

## (undated) DEVICE — SENSOR SPO2 NEO LNCS ADHESIVE (20/BX) SEE USER NOTES

## (undated) DEVICE — BAG RETRIEVAL 10ML (10EA/BX)

## (undated) DEVICE — SCISSORS 5MM CVD (6EA/BX)

## (undated) DEVICE — TUBE CONNECT SUCTION CLEAR 120 X 1/4" (50EA/CA)"

## (undated) DEVICE — GLOVE BIOGEL ECLIPSE PF LATEX SIZE 7.5

## (undated) DEVICE — LACTATED RINGERS INJ 1000 ML - (14EA/CA 60CA/PF)

## (undated) DEVICE — DRESSING TRANSPARENT FILM TEGADERM 2.375 X 2.75"  (100EA/BX)"

## (undated) DEVICE — TUBE E-T HI-LO CUFF 7.0MM (10EA/PK)

## (undated) DEVICE — SPONGE GAUZESTER. 2X2 4-PL - (2/PK 50PK/BX 30BX/CS)

## (undated) DEVICE — SUTURE GENERAL

## (undated) DEVICE — KIT CUSTOM PROCEDURE SINGLE FOR ENDO  (15/CA)

## (undated) DEVICE — TUBING CLEARLINK DUO-VENT - C-FLO (48EA/CA)

## (undated) DEVICE — SET EXTENSION WITH 2 PORTS (48EA/CA) ***PART #2C8610 IS A SUBSTITUTE*****

## (undated) DEVICE — NEPTUNE 4 PORT MANIFOLD - (20/PK)

## (undated) DEVICE — SET TUBING PNEUMOCLEAR HIGH FLOW SMOKE EVACUATION (10EA/BX)

## (undated) DEVICE — SLEEVE VASO CALF MED - (10PR/CA)

## (undated) DEVICE — TROCAR Z THREAD11MM OPTICAL - NON BLADED(6/BX)

## (undated) DEVICE — FILM CASSETTE ENDO

## (undated) DEVICE — ELECTRODE 850 FOAM ADHESIVE - HYDROGEL RADIOTRNSPRNT (50/PK)

## (undated) DEVICE — GLOVE BIOGEL SZ 8 SURGICAL PF LTX - (50PR/BX 4BX/CA)

## (undated) DEVICE — CLIP MED LG INTNL HRZN TI ESCP - (20/BX)

## (undated) DEVICE — GLOVE BIOGEL PI INDICATOR SZ 8.0 SURGICAL PF LF -(50/BX 4BX/CA)

## (undated) DEVICE — GOWN WARMING STANDARD FLEX - (30/CA)

## (undated) DEVICE — SODIUM CHL IRRIGATION 0.9% 1000ML (12EA/CA)

## (undated) DEVICE — GLOVE BIOGEL INDICATOR SZ 8 SURGICAL PF LTX - (50/BX 4BX/CA)

## (undated) DEVICE — TROCAR FIOS OPTICAL ACCESS SYSTEM 5 X 150MM (6/BX)

## (undated) DEVICE — STERI STRIP COMPOUND BENZOIN - TINCTURE 0.6ML WITH APPLICATOR (40EA/BX)

## (undated) DEVICE — CHLORAPREP 26 ML APPLICATOR - ORANGE TINT(25/CA)

## (undated) DEVICE — CANNULA W/SEAL 5X100 Z-THRE - ADED KII (12/BX)

## (undated) DEVICE — CANNULA W/ SUPPLY TUBING O2 - (50/CA)

## (undated) DEVICE — JAGTOME RX 44 PRELOADED .035 X 260CM

## (undated) DEVICE — HEAD HOLDER JUNIOR/ADULT

## (undated) DEVICE — TROCAR 5X100 NON BLADED Z-TH - READ KII (6/BX)

## (undated) DEVICE — SUTURE 0 VICRYL PLUS UR-6 - 27 INCH (36/BX)

## (undated) DEVICE — CANISTER SUCTION 3000ML MECHANICAL FILTER AUTO SHUTOFF MEDI-VAC NONSTERILE LF DISP  (40EA/CA)

## (undated) DEVICE — SOD. CHL 10CC SYRINGE PREFILL - W/10 CC (30/BX)

## (undated) DEVICE — SUCTION INSTRUMENT YANKAUER BULBOUS TIP W/O VENT (50EA/CA)

## (undated) DEVICE — SUTURE 4-0 MONOCRYL PLUS PS-2 - 27 INCH (36/BX)